# Patient Record
Sex: FEMALE | Race: WHITE | NOT HISPANIC OR LATINO | Employment: OTHER | URBAN - METROPOLITAN AREA
[De-identification: names, ages, dates, MRNs, and addresses within clinical notes are randomized per-mention and may not be internally consistent; named-entity substitution may affect disease eponyms.]

---

## 2017-02-13 ENCOUNTER — OUTSIDE FACILITY SERVICE (OUTPATIENT)
Dept: SURGERY | Facility: CLINIC | Age: 52
End: 2017-02-13

## 2017-02-13 PROCEDURE — G0121 COLON CA SCRN NOT HI RSK IND: HCPCS | Performed by: SURGERY

## 2017-05-16 ENCOUNTER — OFFICE VISIT (OUTPATIENT)
Dept: NEUROLOGY | Facility: CLINIC | Age: 52
End: 2017-05-16

## 2017-05-16 VITALS
HEIGHT: 63 IN | DIASTOLIC BLOOD PRESSURE: 58 MMHG | SYSTOLIC BLOOD PRESSURE: 104 MMHG | OXYGEN SATURATION: 100 % | HEART RATE: 85 BPM

## 2017-05-16 DIAGNOSIS — F45.9 SOMATOFORM DISORDER: ICD-10-CM

## 2017-05-16 DIAGNOSIS — G40.909 SEIZURE DISORDER (HCC): Primary | ICD-10-CM

## 2017-05-16 DIAGNOSIS — F19.982 DRUG INDUCED INSOMNIA (HCC): ICD-10-CM

## 2017-05-16 DIAGNOSIS — G43.109 MIGRAINE WITH AURA AND WITHOUT STATUS MIGRAINOSUS, NOT INTRACTABLE: ICD-10-CM

## 2017-05-16 PROCEDURE — 99204 OFFICE O/P NEW MOD 45 MIN: CPT | Performed by: PSYCHIATRY & NEUROLOGY

## 2017-05-16 RX ORDER — TOPIRAMATE 50 MG/1
CAPSULE, EXTENDED RELEASE ORAL
Qty: 90 CAPSULE | Refills: 5 | Status: SHIPPED | OUTPATIENT
Start: 2017-05-16 | End: 2017-05-17 | Stop reason: SDUPTHER

## 2017-05-17 RX ORDER — TOPIRAMATE 50 MG/1
CAPSULE, EXTENDED RELEASE ORAL
Qty: 90 CAPSULE | Refills: 5 | Status: SHIPPED | OUTPATIENT
Start: 2017-05-17 | End: 2019-08-20

## 2017-06-08 ENCOUNTER — TELEPHONE (OUTPATIENT)
Dept: NEUROLOGY | Facility: CLINIC | Age: 52
End: 2017-06-08

## 2017-06-08 NOTE — TELEPHONE ENCOUNTER
I spoke to Mrs. Tavarez after the Trokendi was approved by Medicare it is still going to cost her over $200 a month and she can not use the copay card since she is on Medicare.

## 2017-07-13 ENCOUNTER — TELEPHONE (OUTPATIENT)
Dept: NEUROLOGY | Facility: CLINIC | Age: 52
End: 2017-07-13

## 2017-07-13 NOTE — TELEPHONE ENCOUNTER
Patient calling to request more samples of Trokendi. Please call her when these samples are ready to be picked up.

## 2017-10-30 ENCOUNTER — TRANSCRIBE ORDERS (OUTPATIENT)
Dept: ADMINISTRATIVE | Facility: HOSPITAL | Age: 52
End: 2017-10-30

## 2017-10-30 DIAGNOSIS — Z12.39 BREAST SCREENING: Primary | ICD-10-CM

## 2017-11-30 ENCOUNTER — APPOINTMENT (OUTPATIENT)
Dept: MAMMOGRAPHY | Facility: HOSPITAL | Age: 52
End: 2017-11-30

## 2018-01-05 ENCOUNTER — HOSPITAL ENCOUNTER (OUTPATIENT)
Dept: MAMMOGRAPHY | Facility: HOSPITAL | Age: 53
Discharge: HOME | End: 2018-01-05
Admitting: OBSTETRICS & GYNECOLOGY

## 2018-01-05 DIAGNOSIS — Z12.39 BREAST SCREENING: ICD-10-CM

## 2018-01-05 PROCEDURE — 77067 SCR MAMMO BI INCL CAD: CPT

## 2018-01-05 PROCEDURE — 77063 BREAST TOMOSYNTHESIS BI: CPT

## 2018-01-30 ENCOUNTER — TRANSCRIBE ORDERS (OUTPATIENT)
Dept: MRI IMAGING | Facility: HOSPITAL | Age: 53
End: 2018-01-30

## 2018-01-30 DIAGNOSIS — R92.8 ABNORMAL MAMMOGRAM: Primary | ICD-10-CM

## 2018-02-02 ENCOUNTER — HOSPITAL ENCOUNTER (OUTPATIENT)
Dept: MAMMOGRAPHY | Facility: HOSPITAL | Age: 53
Discharge: HOME | End: 2018-02-02
Admitting: OBSTETRICS & GYNECOLOGY

## 2018-02-02 DIAGNOSIS — R92.8 ABNORMAL MAMMOGRAM: ICD-10-CM

## 2018-02-02 PROCEDURE — 77065 DX MAMMO INCL CAD UNI: CPT

## 2018-02-06 ENCOUNTER — TELEPHONE (OUTPATIENT)
Dept: INTERVENTIONAL RADIOLOGY/VASCULAR | Facility: HOSPITAL | Age: 53
End: 2018-02-06

## 2018-02-06 DIAGNOSIS — E78.5 HYPERLIPIDEMIA, UNSPECIFIED HYPERLIPIDEMIA TYPE: Primary | ICD-10-CM

## 2018-02-06 DIAGNOSIS — Z11.59 NEED FOR HEPATITIS C SCREENING TEST: ICD-10-CM

## 2018-02-09 LAB
ALBUMIN SERPL-MCNC: 4.2 G/DL (ref 3.5–5.2)
ALBUMIN/GLOB SERPL: 1.6 G/DL
ALP SERPL-CCNC: 70 U/L (ref 39–117)
ALT SERPL-CCNC: 12 U/L (ref 1–33)
AST SERPL-CCNC: 13 U/L (ref 1–32)
BILIRUB SERPL-MCNC: 0.3 MG/DL (ref 0.1–1.2)
BUN SERPL-MCNC: 12 MG/DL (ref 6–20)
BUN/CREAT SERPL: 14 (ref 7–25)
CALCIUM SERPL-MCNC: 9.7 MG/DL (ref 8.6–10.5)
CHLORIDE SERPL-SCNC: 102 MMOL/L (ref 98–107)
CHOLEST SERPL-MCNC: 264 MG/DL (ref 0–200)
CO2 SERPL-SCNC: 26.3 MMOL/L (ref 22–29)
CREAT SERPL-MCNC: 0.86 MG/DL (ref 0.57–1)
GFR SERPLBLD CREATININE-BSD FMLA CKD-EPI: 69 ML/MIN/1.73
GFR SERPLBLD CREATININE-BSD FMLA CKD-EPI: 84 ML/MIN/1.73
GLOBULIN SER CALC-MCNC: 2.6 GM/DL
GLUCOSE SERPL-MCNC: 103 MG/DL (ref 65–99)
HCV AB S/CO SERPL IA: 0.1 S/CO RATIO (ref 0–0.9)
HCV AB SERPL QL IA: NORMAL
HDLC SERPL-MCNC: 42 MG/DL (ref 40–60)
LDLC SERPL CALC-MCNC: 202 MG/DL (ref 0–100)
LDLC/HDLC SERPL: 4.8 {RATIO}
POTASSIUM SERPL-SCNC: 4.9 MMOL/L (ref 3.5–5.2)
PROT SERPL-MCNC: 6.8 G/DL (ref 6–8.5)
SODIUM SERPL-SCNC: 143 MMOL/L (ref 136–145)
TRIGL SERPL-MCNC: 102 MG/DL (ref 0–150)
VLDLC SERPL CALC-MCNC: 20.4 MG/DL (ref 5–40)

## 2018-02-12 ENCOUNTER — HOSPITAL ENCOUNTER (OUTPATIENT)
Dept: MAMMOGRAPHY | Facility: HOSPITAL | Age: 53
Discharge: HOME | End: 2018-02-12
Admitting: OBSTETRICS & GYNECOLOGY

## 2018-02-12 VITALS
DIASTOLIC BLOOD PRESSURE: 72 MMHG | HEIGHT: 63 IN | RESPIRATION RATE: 18 BRPM | WEIGHT: 115 LBS | TEMPERATURE: 98.1 F | HEART RATE: 86 BPM | BODY MASS INDEX: 20.38 KG/M2 | SYSTOLIC BLOOD PRESSURE: 106 MMHG | OXYGEN SATURATION: 98 %

## 2018-02-12 DIAGNOSIS — R92.8 ABNORMAL MAMMOGRAM OF RIGHT BREAST: ICD-10-CM

## 2018-02-12 PROCEDURE — 88305 TISSUE EXAM BY PATHOLOGIST: CPT | Performed by: INTERNAL MEDICINE

## 2018-02-12 RX ORDER — LIDOCAINE HYDROCHLORIDE 10 MG/ML
20 INJECTION, SOLUTION INFILTRATION; PERINEURAL ONCE
Status: COMPLETED | OUTPATIENT
Start: 2018-02-12 | End: 2018-02-12

## 2018-02-12 RX ADMIN — LIDOCAINE HYDROCHLORIDE 20 ML: 10 INJECTION, SOLUTION INFILTRATION; PERINEURAL at 12:50

## 2018-02-12 NOTE — NURSING NOTE
Biopsy site to right outer breast clear with Dermabond dry and intact. No firmness or swelling noted at or around biopsy site. Denies pain. Wide Ace pressure wrap applied in lieu of bra per Dr. Jaime's request. Instructed patient the importance of wearing the wrap 24/7 for at least 2 days and then to a bra 24/7 x2 additional days. Ice pack with protective covering applied to biopsy site. Discharge instructions discussed with understanding voiced by patient. Copies provided to patient. No distress noted. To home via private vehicle accompanied by her daughter.

## 2018-02-14 ENCOUNTER — OFFICE VISIT (OUTPATIENT)
Dept: FAMILY MEDICINE CLINIC | Facility: CLINIC | Age: 53
End: 2018-02-14

## 2018-02-14 VITALS
SYSTOLIC BLOOD PRESSURE: 100 MMHG | DIASTOLIC BLOOD PRESSURE: 72 MMHG | BODY MASS INDEX: 19.95 KG/M2 | WEIGHT: 112.6 LBS | TEMPERATURE: 98.3 F | HEART RATE: 79 BPM | RESPIRATION RATE: 16 BRPM | OXYGEN SATURATION: 99 % | HEIGHT: 63 IN

## 2018-02-14 DIAGNOSIS — E78.00 ELEVATED CHOLESTEROL: ICD-10-CM

## 2018-02-14 DIAGNOSIS — R63.4 WEIGHT LOSS, UNINTENTIONAL: Primary | ICD-10-CM

## 2018-02-14 LAB
BASOPHILS # BLD AUTO: 0.05 10*3/MM3 (ref 0–0.2)
BASOPHILS NFR BLD AUTO: 0.5 % (ref 0–1.5)
CYTO UR: NORMAL
EOSINOPHIL # BLD AUTO: 0.05 10*3/MM3 (ref 0–0.7)
EOSINOPHIL NFR BLD AUTO: 0.5 % (ref 0.3–6.2)
ERYTHROCYTE [DISTWIDTH] IN BLOOD BY AUTOMATED COUNT: 13.1 % (ref 11.7–13)
HCT VFR BLD AUTO: 43.6 % (ref 35.6–45.5)
HGB BLD-MCNC: 14.3 G/DL (ref 11.9–15.5)
IMM GRANULOCYTES # BLD: 0.03 10*3/MM3 (ref 0–0.03)
IMM GRANULOCYTES NFR BLD: 0.3 % (ref 0–0.5)
LAB AP CASE REPORT: NORMAL
LAB AP CLINICAL INFORMATION: NORMAL
LYMPHOCYTES # BLD AUTO: 2.38 10*3/MM3 (ref 0.9–4.8)
LYMPHOCYTES NFR BLD AUTO: 21.8 % (ref 19.6–45.3)
Lab: NORMAL
MCH RBC QN AUTO: 31.9 PG (ref 26.9–32)
MCHC RBC AUTO-ENTMCNC: 32.8 G/DL (ref 32.4–36.3)
MCV RBC AUTO: 97.3 FL (ref 80.5–98.2)
MONOCYTES # BLD AUTO: 0.5 10*3/MM3 (ref 0.2–1.2)
MONOCYTES NFR BLD AUTO: 4.6 % (ref 5–12)
NEUTROPHILS # BLD AUTO: 7.9 10*3/MM3 (ref 1.9–8.1)
NEUTROPHILS NFR BLD AUTO: 72.3 % (ref 42.7–76)
PATH REPORT.FINAL DX SPEC: NORMAL
PATH REPORT.GROSS SPEC: NORMAL
PLATELET # BLD AUTO: 333 10*3/MM3 (ref 140–500)
RBC # BLD AUTO: 4.48 10*6/MM3 (ref 3.9–5.2)
TSH SERPL DL<=0.005 MIU/L-ACNC: 1.46 MIU/ML (ref 0.27–4.2)
WBC # BLD AUTO: 10.91 10*3/MM3 (ref 4.5–10.7)

## 2018-02-14 PROCEDURE — 99213 OFFICE O/P EST LOW 20 MIN: CPT | Performed by: INTERNAL MEDICINE

## 2018-02-14 RX ORDER — ATORVASTATIN CALCIUM 80 MG/1
80 TABLET, FILM COATED ORAL NIGHTLY
Qty: 30 TABLET | Refills: 2 | Status: SHIPPED | OUTPATIENT
Start: 2018-02-14 | End: 2019-06-03 | Stop reason: SDUPTHER

## 2018-02-20 NOTE — PROGRESS NOTES
Subjective   Lissa Tavarez is a 52 y.o. female. Patient is here today for   Chief Complaint   Patient presents with   • Follow-up     ELEVATED CHOLESTEROL           Vitals:    02/14/18 1021   BP: 100/72   Pulse: 79   Resp: 16   Temp: 98.3 °F (36.8 °C)   SpO2: 99%       Past Medical History:   Diagnosis Date   • Anxiety    • Cerebral arterial aneurysm    • Depression    • Gestational diabetes    • Hyperglycemia    • Hyperlipidemia    • Seizure disorder    • Seizures       Allergies   Allergen Reactions   • Codeine Phosphate [Codeine] Hives   • Sulfa Antibiotics Hives      Social History     Social History   • Marital status:      Spouse name: N/A   • Number of children: N/A   • Years of education: N/A     Occupational History   • Not on file.     Social History Main Topics   • Smoking status: Former Smoker   • Smokeless tobacco: Former User   • Alcohol use Yes   • Drug use: No   • Sexual activity: Not on file     Other Topics Concern   • Not on file     Social History Narrative        Current Outpatient Prescriptions:   •  atorvastatin (LIPITOR) 80 MG tablet, Take 1 tablet by mouth Every Night., Disp: 30 tablet, Rfl: 2  •  MELATONIN PO, Take 6 mg by mouth Every Night., Disp: , Rfl:   •  terbinafine (LamISIL) 1 % cream, Apply topically., Disp: , Rfl:   •  Topiramate ER 50 MG capsule sustained-release 24 hr, Take 3 at bedtime (Patient taking differently: Take 2 at bedtime), Disp: 90 capsule, Rfl: 5     Objective     HPI Comments: She is here to follow-up on hypercholesterolemia.    She tells me that she is fatigued.  She is a bit anxious because she had a breast biopsy and the pathology is still pending.    She is in had some unintentional weight loss over the last year.       Review of Systems   Constitutional: Positive for fatigue.   HENT: Negative.    Respiratory: Negative.    Cardiovascular: Negative.    Musculoskeletal: Negative.    Psychiatric/Behavioral: Negative.        Physical Exam   Constitutional: She  is oriented to person, place, and time.   Pleasant, neatly groomed, BMI 18.   HENT:   Head: Normocephalic and atraumatic.   Cardiovascular: Normal rate and regular rhythm.    Pulmonary/Chest: Effort normal.   Neurological: She is alert and oriented to person, place, and time.   Psychiatric: She has a normal mood and affect. Her behavior is normal.   Nursing note and vitals reviewed.        Problem List Items Addressed This Visit        Cardiovascular and Mediastinum    Elevated cholesterol    Relevant Medications    atorvastatin (LIPITOR) 80 MG tablet       Other    Weight loss, unintentional - Primary    Relevant Orders    CBC & Differential (Completed)    TSH Rfx On Abnormal To Free T4 (Completed)            PLAN  Like to check a CBC and TSH regarding her unintentional weight loss.    I increased her atorvastatin 80 mg daily.  Like to have her back in about 3 months recheck lipid and comprehensive metabolic panel at that time.  No Follow-up on file.

## 2018-05-08 DIAGNOSIS — E78.5 HYPERLIPIDEMIA, UNSPECIFIED HYPERLIPIDEMIA TYPE: ICD-10-CM

## 2018-05-08 DIAGNOSIS — Z79.899 LONG TERM USE OF DRUG: Primary | ICD-10-CM

## 2018-08-30 ENCOUNTER — TELEPHONE (OUTPATIENT)
Dept: NEUROSURGERY | Facility: CLINIC | Age: 53
End: 2018-08-30

## 2018-08-30 NOTE — TELEPHONE ENCOUNTER
Patient is a 5 yr recall. Pt called to schedule her appts. She is going on vacation from 9/27 -10/11. She is scheduled to see LB on 11/1.  Mailing forms   Pt has new ins [facility] [employer] Assured  ID [id number]  [id number]  [facility]

## 2018-12-12 ENCOUNTER — TELEPHONE (OUTPATIENT)
Dept: NEUROSURGERY | Facility: CLINIC | Age: 53
End: 2018-12-12

## 2018-12-12 NOTE — TELEPHONE ENCOUNTER
----- Message from Kalpana Gomez sent at 12/12/2018  1:32 PM EST -----  Pt cancelled appt on 12/19 due to having insurance issue and not being cover. She rescheduled in January.

## 2019-01-23 ENCOUNTER — OFFICE VISIT (OUTPATIENT)
Dept: NEUROSURGERY | Facility: CLINIC | Age: 54
End: 2019-01-23

## 2019-01-23 VITALS
BODY MASS INDEX: 20.91 KG/M2 | HEIGHT: 63 IN | HEART RATE: 72 BPM | RESPIRATION RATE: 16 BRPM | WEIGHT: 118 LBS | SYSTOLIC BLOOD PRESSURE: 106 MMHG | DIASTOLIC BLOOD PRESSURE: 62 MMHG

## 2019-01-23 DIAGNOSIS — I67.1 CEREBRAL ANEURYSM, NONRUPTURED: ICD-10-CM

## 2019-01-23 DIAGNOSIS — Z98.890 HISTORY OF CRANIOTOMY: ICD-10-CM

## 2019-01-23 DIAGNOSIS — I67.89 OTHER CEREBROVASCULAR DISEASE: ICD-10-CM

## 2019-01-23 DIAGNOSIS — I67.1 CEREBRAL ANEURYSM: Primary | ICD-10-CM

## 2019-01-23 PROCEDURE — 99203 OFFICE O/P NEW LOW 30 MIN: CPT | Performed by: NURSE PRACTITIONER

## 2019-01-23 RX ORDER — SODIUM CHLORIDE 9 MG/ML
100 INJECTION, SOLUTION INTRAVENOUS CONTINUOUS
Status: CANCELLED | OUTPATIENT
Start: 2019-02-14

## 2019-01-23 NOTE — PROGRESS NOTES
"Subjective   Patient ID: Lissa Tavarez is a 53 y.o. female is here today for follow-up of previously treated aneurysms prior to repeating angiography. She is accompanied by her cousin, Martha.    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  There was recannulization and she underwent attempted endovascular embolization on January 18, 2007 that was aborted secondary to vasospasm.  She ultimately underwent right craniotomy and clipping in February 2007.  She presents today for preoperative evaluation before undergoing follow-up cerebral angiogram for continued aneurysm surveillance.  She was last seen in September 2013 and at that time angiogram demonstrated total occlusion of the clipped aneurysm.  There was no evidence of new aneurysm formation.    Today, she states that she is doing and feeling very well.  She does have some \"weird eye issues\".  She underwent a full ophthalmology evaluation recently and was given new glasses.  She describes intermittent changes in her vision but at times seems blurry.  She attributes this to her \"focal seizures\".  She remains on Topamax for seizure prophylaxis and is followed closely by neurology.  She denies any double vision.  She also denies any gait or balance issues as well as any other neuro changes at this time.    She presents with her cousin.      /62 (BP Location: Right arm, Patient Position: Sitting, Cuff Size: Adult)   Pulse 72   Resp 16   Ht 160 cm (63\")   Wt 53.5 kg (118 lb)   BMI 20.90 kg/m²       The following portions of the patient's history were reviewed and updated as appropriate: allergies, current medications, past family history, past medical history, past social history, past surgical history and problem list.    Review of Systems   Constitutional: Negative for fever.   HENT: Negative for trouble swallowing.  "   Respiratory: Negative for cough.    Cardiovascular: Negative for chest pain and leg swelling.   Musculoskeletal: Positive for gait problem.   Neurological: Positive for dizziness, light-headedness and headaches. Negative for speech difficulty.   Psychiatric/Behavioral: Negative for decreased concentration.       Objective   Physical Exam   Constitutional: She is oriented to person, place, and time. Vital signs are normal. She appears well-developed and well-nourished. She is cooperative.  Non-toxic appearance. She does not have a sickly appearance. She does not appear ill.   Very pleasant well appearing middle-aged female   HENT:   Head: Normocephalic and atraumatic.   Eyes: EOM are normal. Pupils are equal, round, and reactive to light.   Neck: Neck supple. No tracheal deviation present.   Cardiovascular: Normal rate, regular rhythm, normal heart sounds and intact distal pulses.   Pulmonary/Chest: Effort normal and breath sounds normal. No respiratory distress.   Musculoskeletal: Normal range of motion.   Moving all extremities well   Neurological: She is alert and oriented to person, place, and time. She has normal strength. She displays no tremor. No cranial nerve deficit or sensory deficit. Coordination and gait normal. GCS eye subscore is 4. GCS verbal subscore is 5. GCS motor subscore is 6.   Stable upright gait, able to heel and toe walk, able to tandem  Cranial nerves II through XII grossly intact   Skin: Skin is warm and dry.   Psychiatric: She has a normal mood and affect. Her behavior is normal. Thought content normal.   Vitals reviewed.    Neurologic Exam     Mental Status   Oriented to person, place, and time.     Cranial Nerves     CN III, IV, VI   Pupils are equal, round, and reactive to light.  Extraocular motions are normal.     Motor Exam     Strength   Strength 5/5 throughout.       Assessment/Plan   Independent Review of Radiographic Studies:    No new imaging    Medical Decision Making:    She  "returns to the office today for 5 year follow-up with history treated cerebral aneurysm.  Exam as noted above, no red flags.  She will be scheduled for follow-up cerebral angiogram following today's office visit.  Upon full clinical evaluation she is stable to proceed forward.  Risks and benefits of the angiogram were discussed at length and she wishes to proceed forward.  Preoperative testing will be reviewed accordingly.  She has no neuro deficits on exam.    Plan: Cerebral angiogram, return to office as scheduled following  Lissa was seen today for cerebral aneurysm.    Diagnoses and all orders for this visit:    Cerebral aneurysm  -     Case Request; Standing  -     CBC and Differential; Future  -     Comprehensive metabolic panel; Future  -     Protime-INR; Future  -     Sedimentation rate; Future  -     sodium chloride 0.9 % infusion; Infuse 100 mL/hr into a venous catheter Continuous.  -     Case Request    Other cerebrovascular disease   -     Protime-INR; Future    Cerebral aneurysm, nonruptured    History of craniotomy    Other orders  -     Follow Anesthesia Guidelines / Standing Orders; Future  -     Obtain informed consent  -     Provide NPO Instructions to Patient; Future  -     Provide Patient with Enhanced Recovery Booklet(s) or Handout; Future  -     Provide \"Carbo Loading\" Instructions to Patient; Future  -     Follow Anesthesia Guidelines / Standing Orders; Standing  -     Verify NPO Status; Standing  -     POC Glucose Once; Standing      No Follow-up on file.                 "

## 2019-01-24 PROBLEM — I67.1 CEREBRAL ANEURYSM: Status: ACTIVE | Noted: 2019-01-24

## 2019-01-28 ENCOUNTER — RESULTS ENCOUNTER (OUTPATIENT)
Dept: NEUROSURGERY | Facility: CLINIC | Age: 54
End: 2019-01-28

## 2019-01-28 DIAGNOSIS — I67.1 CEREBRAL ANEURYSM: ICD-10-CM

## 2019-01-28 DIAGNOSIS — I67.89 OTHER CEREBROVASCULAR DISEASE: ICD-10-CM

## 2019-02-05 ENCOUNTER — APPOINTMENT (OUTPATIENT)
Dept: PREADMISSION TESTING | Facility: HOSPITAL | Age: 54
End: 2019-02-05

## 2019-02-05 VITALS
HEART RATE: 86 BPM | BODY MASS INDEX: 21.54 KG/M2 | DIASTOLIC BLOOD PRESSURE: 79 MMHG | HEIGHT: 63 IN | OXYGEN SATURATION: 100 % | WEIGHT: 121.56 LBS | TEMPERATURE: 98.1 F | RESPIRATION RATE: 10 BRPM | SYSTOLIC BLOOD PRESSURE: 126 MMHG

## 2019-02-05 LAB
ALBUMIN SERPL-MCNC: 4.1 G/DL (ref 3.5–5.2)
ALBUMIN/GLOB SERPL: 1.4 G/DL
ALP SERPL-CCNC: 76 U/L (ref 39–117)
ALT SERPL W P-5'-P-CCNC: 15 U/L (ref 1–33)
ANION GAP SERPL CALCULATED.3IONS-SCNC: 12.9 MMOL/L
AST SERPL-CCNC: 15 U/L (ref 1–32)
BASOPHILS # BLD AUTO: 0.06 10*3/MM3 (ref 0–0.2)
BASOPHILS NFR BLD AUTO: 0.6 % (ref 0–1.5)
BILIRUB SERPL-MCNC: 0.3 MG/DL (ref 0.1–1.2)
BUN BLD-MCNC: 15 MG/DL (ref 6–20)
BUN/CREAT SERPL: 15.8 (ref 7–25)
CALCIUM SPEC-SCNC: 9.4 MG/DL (ref 8.6–10.5)
CHLORIDE SERPL-SCNC: 102 MMOL/L (ref 98–107)
CO2 SERPL-SCNC: 27.1 MMOL/L (ref 22–29)
CREAT BLD-MCNC: 0.95 MG/DL (ref 0.57–1)
DEPRECATED RDW RBC AUTO: 46.9 FL (ref 37–54)
EOSINOPHIL # BLD AUTO: 0.08 10*3/MM3 (ref 0–0.7)
EOSINOPHIL NFR BLD AUTO: 0.8 % (ref 0.3–6.2)
ERYTHROCYTE [DISTWIDTH] IN BLOOD BY AUTOMATED COUNT: 13.3 % (ref 11.7–13)
ERYTHROCYTE [SEDIMENTATION RATE] IN BLOOD: 7 MM/HR (ref 0–30)
GFR SERPL CREATININE-BSD FRML MDRD: 61 ML/MIN/1.73
GLOBULIN UR ELPH-MCNC: 3 GM/DL
GLUCOSE BLD-MCNC: 104 MG/DL (ref 65–99)
HCT VFR BLD AUTO: 43.2 % (ref 35.6–45.5)
HGB BLD-MCNC: 13.9 G/DL (ref 11.9–15.5)
IMM GRANULOCYTES # BLD AUTO: 0.02 10*3/MM3 (ref 0–0.03)
IMM GRANULOCYTES NFR BLD AUTO: 0.2 % (ref 0–0.5)
INR PPP: 0.96 (ref 0.9–1.1)
LYMPHOCYTES # BLD AUTO: 3.59 10*3/MM3 (ref 0.9–4.8)
LYMPHOCYTES NFR BLD AUTO: 33.9 % (ref 19.6–45.3)
MCH RBC QN AUTO: 31 PG (ref 26.9–32)
MCHC RBC AUTO-ENTMCNC: 32.2 G/DL (ref 32.4–36.3)
MCV RBC AUTO: 96.4 FL (ref 80.5–98.2)
MONOCYTES # BLD AUTO: 0.59 10*3/MM3 (ref 0.2–1.2)
MONOCYTES NFR BLD AUTO: 5.6 % (ref 5–12)
NEUTROPHILS # BLD AUTO: 6.26 10*3/MM3 (ref 1.9–8.1)
NEUTROPHILS NFR BLD AUTO: 58.9 % (ref 42.7–76)
PLATELET # BLD AUTO: 283 10*3/MM3 (ref 140–500)
PMV BLD AUTO: 8.5 FL (ref 6–12)
POTASSIUM BLD-SCNC: 3.8 MMOL/L (ref 3.5–5.2)
PROT SERPL-MCNC: 7.1 G/DL (ref 6–8.5)
PROTHROMBIN TIME: 12.6 SECONDS (ref 11.7–14.2)
RBC # BLD AUTO: 4.48 10*6/MM3 (ref 3.9–5.2)
SODIUM BLD-SCNC: 142 MMOL/L (ref 136–145)
WBC NRBC COR # BLD: 10.6 10*3/MM3 (ref 4.5–10.7)

## 2019-02-05 PROCEDURE — 85025 COMPLETE CBC W/AUTO DIFF WBC: CPT | Performed by: NURSE PRACTITIONER

## 2019-02-05 PROCEDURE — 36415 COLL VENOUS BLD VENIPUNCTURE: CPT | Performed by: NURSE PRACTITIONER

## 2019-02-05 PROCEDURE — 85652 RBC SED RATE AUTOMATED: CPT | Performed by: NURSE PRACTITIONER

## 2019-02-05 PROCEDURE — 85610 PROTHROMBIN TIME: CPT | Performed by: NURSE PRACTITIONER

## 2019-02-05 PROCEDURE — 80053 COMPREHEN METABOLIC PANEL: CPT | Performed by: NURSE PRACTITIONER

## 2019-02-05 NOTE — DISCHARGE INSTRUCTIONS
Take the following medications the morning of surgery with a small sip of water:    TOPIRAMATE    General Instructions:  • Do not eat or drink anything after midnight the night before surgery.  • Bring any papers given to you in the doctor’s office.  • Wear clean comfortable clothes and socks.  • Do not wear contact lenses or make-up.  Bring a case for your glasses.   • Remove all piercings.  Leave jewelry and any other valuables at home.  • Hair extensions with metal clips must be removed prior to surgery.  • The Pre-Admission Testing nurse will instruct you to bring medications if unable to obtain an accurate list in Pre-Admission Testing.    • REPORT TO MAIN SURGERY ON 2-14-19 AT 0630 AM          Preventing a Surgical Site Infection:  • For 2 to 3 days before surgery, avoid shaving with a razor because the razor can irritate skin and make it easier to develop an infection.    • Any areas of open skin can increase the risk of a post-operative wound infection by allowing bacteria to enter and travel throughout the body.  Notify your surgeon if you have any skin wounds / rashes even if it is not near the expected surgical site.  The area will need assessed to determine if surgery should be delayed until it is healed.  • The night prior to surgery sleep in a clean bed with clean clothing.  Do not allow pets to sleep with you.  • Shower on the morning of surgery using a fresh bar of anti-bacterial soap (such as Dial) and clean washcloth.  Dry with a clean towel and dress in clean clothing.  • Ask your surgeon if you will be receiving antibiotics prior to surgery.  • Make sure you, your family, and all healthcare providers clean their hands with soap and water or an alcohol based hand  before caring for you or your wound.    Day of surgery:  Upon arrival, a Pre-op nurse and Anesthesiologist will review your health history, obtain vital signs, and answer questions you may have.  The only belongings needed at  this time will be your home medications and if applicable your C-PAP/BI-PAP machine.  If you are staying overnight your family can leave the rest of your belongings in the car and bring them to your room later.  A Pre-op nurse will start an IV and you may receive medication in preparation for surgery, including something to help you relax.  Your family will be able to see you in the Pre-op area.  While you are in surgery your family should notify the waiting room  if they leave the waiting room area and provide a contact phone number.    Please be aware that surgery does come with discomfort.  We want to make every effort to control your discomfort so please discuss any uncontrolled symptoms with your nurse.   Your doctor will most likely have prescribed pain medications.      If you are going home after surgery you will receive individualized written care instructions before being discharged.  A responsible adult must drive you to and from the hospital on the day of your surgery and stay with you for 24 hours.        You have received a list of surgical assistants for your reference.  If you have any questions please call Pre-Admission Testing at [phone].  Deductibles and co-payments are collected on the day of service. Please be prepared to pay the required co-pay, deductible or deposit on the day of service as defined by your plan.

## 2019-02-14 ENCOUNTER — HOSPITAL ENCOUNTER (OUTPATIENT)
Facility: HOSPITAL | Age: 54
Setting detail: HOSPITAL OUTPATIENT SURGERY
Discharge: HOME | End: 2019-02-14
Attending: NEUROLOGICAL SURGERY | Admitting: NEUROLOGICAL SURGERY

## 2019-02-14 ENCOUNTER — APPOINTMENT (OUTPATIENT)
Dept: GENERAL RADIOLOGY | Facility: HOSPITAL | Age: 54
End: 2019-02-14

## 2019-02-14 VITALS
SYSTOLIC BLOOD PRESSURE: 116 MMHG | WEIGHT: 119.25 LBS | OXYGEN SATURATION: 99 % | BODY MASS INDEX: 21.13 KG/M2 | TEMPERATURE: 98.3 F | HEIGHT: 63 IN | HEART RATE: 70 BPM | DIASTOLIC BLOOD PRESSURE: 37 MMHG | RESPIRATION RATE: 18 BRPM

## 2019-02-14 DIAGNOSIS — I67.1 CEREBRAL ANEURYSM: ICD-10-CM

## 2019-02-14 LAB — GLUCOSE BLDC GLUCOMTR-MCNC: 107 MG/DL (ref 70–130)

## 2019-02-14 PROCEDURE — 36224 PLACE CATH CAROTD ART: CPT | Performed by: NEUROLOGICAL SURGERY

## 2019-02-14 PROCEDURE — 76377 3D RENDER W/INTRP POSTPROCES: CPT

## 2019-02-14 PROCEDURE — 76377 3D RENDER W/INTRP POSTPROCES: CPT | Performed by: NEUROLOGICAL SURGERY

## 2019-02-14 PROCEDURE — C1769 GUIDE WIRE: HCPCS | Performed by: NEUROLOGICAL SURGERY

## 2019-02-14 PROCEDURE — C1894 INTRO/SHEATH, NON-LASER: HCPCS | Performed by: NEUROLOGICAL SURGERY

## 2019-02-14 PROCEDURE — 25010000002 ONDANSETRON PER 1 MG: Performed by: NEUROLOGICAL SURGERY

## 2019-02-14 PROCEDURE — 82962 GLUCOSE BLOOD TEST: CPT

## 2019-02-14 PROCEDURE — C1760 CLOSURE DEV, VASC: HCPCS | Performed by: NEUROLOGICAL SURGERY

## 2019-02-14 PROCEDURE — 0 IODIXANOL PER 1 ML: Performed by: NEUROLOGICAL SURGERY

## 2019-02-14 PROCEDURE — 25010000002 HEPARIN (PORCINE) PER 1000 UNITS: Performed by: NEUROLOGICAL SURGERY

## 2019-02-14 PROCEDURE — 25010000002 MIDAZOLAM PER 1 MG: Performed by: NEUROLOGICAL SURGERY

## 2019-02-14 RX ORDER — FENTANYL CITRATE 50 UG/ML
INJECTION, SOLUTION INTRAMUSCULAR; INTRAVENOUS
Status: DISCONTINUED
Start: 2019-02-14 | End: 2019-02-14 | Stop reason: WASHOUT

## 2019-02-14 RX ORDER — MIDAZOLAM HYDROCHLORIDE 1 MG/ML
INJECTION INTRAMUSCULAR; INTRAVENOUS
Status: DISCONTINUED
Start: 2019-02-14 | End: 2019-02-14 | Stop reason: HOSPADM

## 2019-02-14 RX ORDER — MIDAZOLAM HYDROCHLORIDE 1 MG/ML
INJECTION INTRAMUSCULAR; INTRAVENOUS
Status: COMPLETED | OUTPATIENT
Start: 2019-02-14 | End: 2019-02-14

## 2019-02-14 RX ORDER — SODIUM CHLORIDE 9 MG/ML
100 INJECTION, SOLUTION INTRAVENOUS CONTINUOUS
Status: DISCONTINUED | OUTPATIENT
Start: 2019-02-14 | End: 2019-02-14 | Stop reason: HOSPADM

## 2019-02-14 RX ORDER — ONDANSETRON 2 MG/ML
INJECTION INTRAMUSCULAR; INTRAVENOUS
Status: COMPLETED | OUTPATIENT
Start: 2019-02-14 | End: 2019-02-14

## 2019-02-14 RX ORDER — SODIUM CHLORIDE 9 MG/ML
INJECTION, SOLUTION INTRAVENOUS
Status: COMPLETED | OUTPATIENT
Start: 2019-02-14 | End: 2019-02-14

## 2019-02-14 RX ORDER — ATORVASTATIN CALCIUM 80 MG/1
80 TABLET, FILM COATED ORAL NIGHTLY
Status: DISCONTINUED | OUTPATIENT
Start: 2019-02-14 | End: 2019-02-14 | Stop reason: HOSPADM

## 2019-02-14 RX ORDER — IODIXANOL 320 MG/ML
250 INJECTION, SOLUTION INTRAVASCULAR
Status: COMPLETED | OUTPATIENT
Start: 2019-02-14 | End: 2019-02-14

## 2019-02-14 RX ADMIN — ONDANSETRON 4 MG: 2 INJECTION INTRAMUSCULAR; INTRAVENOUS at 09:24

## 2019-02-14 RX ADMIN — SODIUM CHLORIDE 250 ML: 0.9 INJECTION, SOLUTION INTRAVENOUS at 09:31

## 2019-02-14 RX ADMIN — IODIXANOL 55.6 ML: 320 INJECTION, SOLUTION INTRAVASCULAR at 08:20

## 2019-02-14 RX ADMIN — SODIUM CHLORIDE 250 ML: 0.9 INJECTION, SOLUTION INTRAVENOUS at 09:42

## 2019-02-14 RX ADMIN — Medication 2 MG: at 09:19

## 2019-02-27 ENCOUNTER — OFFICE VISIT (OUTPATIENT)
Dept: NEUROSURGERY | Facility: CLINIC | Age: 54
End: 2019-02-27

## 2019-02-27 VITALS
BODY MASS INDEX: 21.62 KG/M2 | SYSTOLIC BLOOD PRESSURE: 100 MMHG | RESPIRATION RATE: 16 BRPM | TEMPERATURE: 99.3 F | WEIGHT: 122 LBS | HEART RATE: 88 BPM | HEIGHT: 63 IN | DIASTOLIC BLOOD PRESSURE: 60 MMHG

## 2019-02-27 DIAGNOSIS — I67.1 CEREBRAL ANEURYSM: Primary | ICD-10-CM

## 2019-02-27 PROCEDURE — 99213 OFFICE O/P EST LOW 20 MIN: CPT | Performed by: NURSE PRACTITIONER

## 2019-02-27 NOTE — PROGRESS NOTES
" HPI:   Lissa Tavarez is a 54 y.o. female for follow-up of right ICA, superior hypophyseal artery region aneurysm. She is status post cerebral angiogram with embolization in May 2006.  There is recanalization of the aneurysm with attempted embolization in January 2007.  This was aborted.  She underwent craniotomy with clipping in February 2007.  Since her last office visit she had cerebral angiogram on February 14, 2019. She was discharged to home. She has not had postoperative complications. No new headaches; she has chronic HA. She is on topiramate. No recent seizures.    She presents unaccompanied.    Review of Systems   Eyes: Positive for visual disturbance (floaters/blurred vision).   Cardiovascular: Negative for leg swelling.   Skin: Negative for color change (or coolness of procedure leg), pallor and wound (swelling, excess bruising or bleeding of groin site ).   Neurological: Positive for headaches (on topirimate). Negative for facial asymmetry, speech difficulty, weakness and numbness (of procedure leg).        /60 (BP Location: Left arm, Patient Position: Sitting, Cuff Size: Adult)   Pulse 88   Temp 99.3 °F (37.4 °C)   Resp 16   Ht 160 cm (63\")   Wt 55.3 kg (122 lb)   BMI 21.61 kg/m²     Physical Exam   Constitutional: She is oriented to person, place, and time. She appears well-developed and well-nourished.   Neck: Neck supple.   Cardiovascular: Intact distal pulses.   Pulmonary/Chest: Effort normal.   Musculoskeletal: She exhibits no edema.   Neurological: She is alert and oriented to person, place, and time. She has a normal Finger-Nose-Finger Test. Gait normal.   Skin: Skin is warm and dry. Capillary refill takes less than 2 seconds. No pallor.   Psychiatric: She has a normal mood and affect. Her speech is normal and behavior is normal. Judgment normal.   Vitals reviewed.    Neurologic Exam     Mental Status   Oriented to person, place, and time.   Follows 2 step commands.   Attention: " normal. Concentration: normal.   Speech: speech is normal   Level of consciousness: alert  Knowledge: good.   Normal comprehension.     Cranial Nerves   Cranial nerves II through XII intact.     Motor Exam   Right arm pronator drift: absent  Left arm pronator drift: absent    Gait, Coordination, and Reflexes     Gait  Gait: normal    Coordination   Finger to nose coordination: normal      Findings/Results:  Cerebral angiogram reviewed with Dr. Ko.  This reveals 100% obliteration of the previously treated aneurysm.    Assessment/Plan:  Lissa was seen today for cerebral aneurysm.    Diagnoses and all orders for this visit:    Cerebral aneurysm  -     CT Angiogram Head With & Without Contrast; Future      Discussion/Summary  Patient presents for follow-up after cerebral angiogram to reevaluate previously coiled and clipped right superior hypophyseal artery region aneurysm.  She denies any postprocedural issues.  Exam is as noted above with no neurologic changes.  Angiogram shows 100% obliteration of previously treated aneurysm.  We will plan to see her back in 5 years with CTA head.    Plan: Return in about 5 years (around 2/27/2024) for Follow-up with NP, with imaging.         Patient Care Team    Patient Care Team:  Aries Morejon MD as PCP - General  Aries Morejon MD as PCP - Family Medicine  Aries Morejon MD as PCP - Claims Attributed  Billy Pinedo MD as Consulting Physician (Neurology)    Fariba Munoz, APRN  2/27/2019    Dragon disclaimer:   Much of this encounter note is an electronic transcription/translation of spoken language to printed text. The electronic translation of spoken language may permit erroneous, or at times, nonsensical words or phrases to be inadvertently transcribed; Although I have reviewed the note for such errors, some may still exist.

## 2019-06-03 ENCOUNTER — OFFICE VISIT (OUTPATIENT)
Dept: FAMILY MEDICINE CLINIC | Facility: CLINIC | Age: 54
End: 2019-06-03

## 2019-06-03 VITALS
OXYGEN SATURATION: 98 % | HEART RATE: 81 BPM | SYSTOLIC BLOOD PRESSURE: 104 MMHG | BODY MASS INDEX: 21.14 KG/M2 | TEMPERATURE: 97.8 F | WEIGHT: 119.3 LBS | HEIGHT: 63 IN | RESPIRATION RATE: 16 BRPM | DIASTOLIC BLOOD PRESSURE: 70 MMHG

## 2019-06-03 DIAGNOSIS — J20.9 ACUTE BRONCHITIS, UNSPECIFIED ORGANISM: ICD-10-CM

## 2019-06-03 DIAGNOSIS — R05.9 COUGH: Primary | ICD-10-CM

## 2019-06-03 PROCEDURE — 99214 OFFICE O/P EST MOD 30 MIN: CPT | Performed by: INTERNAL MEDICINE

## 2019-06-03 PROCEDURE — 71046 X-RAY EXAM CHEST 2 VIEWS: CPT | Performed by: INTERNAL MEDICINE

## 2019-06-03 RX ORDER — AZITHROMYCIN 250 MG/1
TABLET, FILM COATED ORAL
Qty: 6 TABLET | Refills: 0 | Status: SHIPPED | OUTPATIENT
Start: 2019-06-03 | End: 2019-08-20

## 2019-06-03 RX ORDER — ATORVASTATIN CALCIUM 80 MG/1
80 TABLET, FILM COATED ORAL NIGHTLY
Qty: 30 TABLET | Refills: 2 | Status: SHIPPED | OUTPATIENT
Start: 2019-06-03 | End: 2019-08-12 | Stop reason: SDUPTHER

## 2019-06-03 NOTE — PROGRESS NOTES
Subjective   Lissa Tavarez is a 54 y.o. female. Patient is here today for   Chief Complaint   Patient presents with   • Fever     PT STATES HAS BEEN FEELING THIS WAY SINCE APRIL    • Chills   • Cough   • GLOOMY          Vitals:    19 1034   BP: 104/70   Pulse: 81   Resp: 16   Temp: 97.8 °F (36.6 °C)   SpO2: 98%       Past Medical History:   Diagnosis Date   • Anxiety    • Cerebral arterial aneurysm    • Depression    • Epilepsy (CMS/HCC)     LAST SEIZURE    • Gestational diabetes    • H/O breast biopsy    • Hyperglycemia    • Hyperlipidemia    • Migraines    • PONV (postoperative nausea and vomiting)    • Spinal headache     FROM EPIDURAL HAD BLOOD PATCH X 2      Allergies   Allergen Reactions   • Codeine Phosphate [Codeine] Hives     SWELLING OF THROAT   • Sulfa Antibiotics Hives     ITCHING      Social History     Socioeconomic History   • Marital status:      Spouse name: Not on file   • Number of children: Not on file   • Years of education: Not on file   • Highest education level: Not on file   Occupational History   • Occupation: disabled   Tobacco Use   • Smoking status: Former Smoker     Packs/day: 0.50     Types: Cigarettes     Last attempt to quit:      Years since quittin.4   • Smokeless tobacco: Never Used   Substance and Sexual Activity   • Alcohol use: No   • Drug use: No   • Sexual activity: Defer        Current Outpatient Medications:   •  atorvastatin (LIPITOR) 80 MG tablet, Take 1 tablet by mouth Every Night., Disp: 30 tablet, Rfl: 2  •  Topiramate ER 50 MG capsule sustained-release 24 hr, Take 3 at bedtime (Patient taking differently: Take 50 mg by mouth 2 (Two) Times a Day. One tablet twice a day), Disp: 90 capsule, Rfl: 5  •  azithromycin (ZITHROMAX) 250 MG tablet, Take 2 tablets the first day, then 1 tablet daily for 4 days., Disp: 6 tablet, Rfl: 0     Objective     She continues to smoke cigarettes.  Complains of a productive cough for the last 6 weeks.    She  denies dyspnea.    She has had the blues lately but is coming up on the anniversary of the death of her .         Review of Systems   Constitutional: Negative.    HENT: Negative.    Respiratory: Positive for cough. Negative for choking, shortness of breath, wheezing and stridor.    Cardiovascular: Negative.    Psychiatric/Behavioral: Positive for dysphoric mood.       Physical Exam   Constitutional: She is oriented to person, place, and time. She appears well-developed and well-nourished.   Pleasant, neatly groomed, BMI 21.   HENT:   Head: Normocephalic and atraumatic.   Cardiovascular: Normal rate, regular rhythm and normal heart sounds.   Pulmonary/Chest: Effort normal and breath sounds normal. No stridor. No respiratory distress. She has no wheezes.   Neurological: She is alert and oriented to person, place, and time.   Psychiatric: She has a normal mood and affect. Her behavior is normal.   Nursing note and vitals reviewed.        Problem List Items Addressed This Visit        Respiratory    Acute bronchitis      Other Visit Diagnoses     Cough    -  Primary    Relevant Orders    XR Chest PA & Lateral (In Office)        PA and lateral chest x-ray show no acute or chronic changes.    PLAN  To send out a prescription for Zithromax to take as directed, I asked her to restart atorvastatin, and I asked her to follow-up in about 3 to 4 months to recheck a lipid and a comprehensive metabolic panel.  If her cough is persistent, she will let me know.  No Follow-up on file.

## 2019-08-12 RX ORDER — ATORVASTATIN CALCIUM 80 MG/1
80 TABLET, FILM COATED ORAL NIGHTLY
Qty: 30 TABLET | Refills: 2 | Status: SHIPPED | OUTPATIENT
Start: 2019-08-12 | End: 2019-12-09

## 2019-08-20 ENCOUNTER — OFFICE VISIT (OUTPATIENT)
Dept: FAMILY MEDICINE CLINIC | Facility: CLINIC | Age: 54
End: 2019-08-20

## 2019-08-20 VITALS
RESPIRATION RATE: 17 BRPM | OXYGEN SATURATION: 98 % | SYSTOLIC BLOOD PRESSURE: 100 MMHG | HEIGHT: 63 IN | HEART RATE: 86 BPM | TEMPERATURE: 97.1 F | WEIGHT: 120 LBS | BODY MASS INDEX: 21.26 KG/M2 | DIASTOLIC BLOOD PRESSURE: 70 MMHG

## 2019-08-20 DIAGNOSIS — R19.7 DIARRHEA, UNSPECIFIED TYPE: Primary | ICD-10-CM

## 2019-08-20 LAB
ALBUMIN SERPL-MCNC: 4.7 G/DL (ref 3.5–5.2)
ALBUMIN/GLOB SERPL: 1.7 G/DL
ALP SERPL-CCNC: 77 U/L (ref 39–117)
ALT SERPL-CCNC: 15 U/L (ref 1–33)
AST SERPL-CCNC: 13 U/L (ref 1–32)
BASOPHILS # BLD AUTO: 0.07 10*3/MM3 (ref 0–0.2)
BASOPHILS NFR BLD AUTO: 0.7 % (ref 0–1.5)
BILIRUB SERPL-MCNC: 0.4 MG/DL (ref 0.2–1.2)
BUN SERPL-MCNC: 9 MG/DL (ref 6–20)
BUN/CREAT SERPL: 10 (ref 7–25)
CALCIUM SERPL-MCNC: 9.4 MG/DL (ref 8.6–10.5)
CHLORIDE SERPL-SCNC: 105 MMOL/L (ref 98–107)
CO2 SERPL-SCNC: 24.3 MMOL/L (ref 22–29)
CREAT SERPL-MCNC: 0.9 MG/DL (ref 0.57–1)
EOSINOPHIL # BLD AUTO: 0.11 10*3/MM3 (ref 0–0.4)
EOSINOPHIL NFR BLD AUTO: 1.1 % (ref 0.3–6.2)
ERYTHROCYTE [DISTWIDTH] IN BLOOD BY AUTOMATED COUNT: 13.7 % (ref 12.3–15.4)
GLOBULIN SER CALC-MCNC: 2.7 GM/DL
GLUCOSE SERPL-MCNC: 103 MG/DL (ref 65–99)
HCT VFR BLD AUTO: 46.7 % (ref 34–46.6)
HGB BLD-MCNC: 14.6 G/DL (ref 12–15.9)
IMM GRANULOCYTES # BLD AUTO: 0.05 10*3/MM3 (ref 0–0.05)
IMM GRANULOCYTES NFR BLD AUTO: 0.5 % (ref 0–0.5)
LYMPHOCYTES # BLD AUTO: 2.66 10*3/MM3 (ref 0.7–3.1)
LYMPHOCYTES NFR BLD AUTO: 27.6 % (ref 19.6–45.3)
MCH RBC QN AUTO: 30.4 PG (ref 26.6–33)
MCHC RBC AUTO-ENTMCNC: 31.3 G/DL (ref 31.5–35.7)
MCV RBC AUTO: 97.1 FL (ref 79–97)
MONOCYTES # BLD AUTO: 0.64 10*3/MM3 (ref 0.1–0.9)
MONOCYTES NFR BLD AUTO: 6.6 % (ref 5–12)
NEUTROPHILS # BLD AUTO: 6.12 10*3/MM3 (ref 1.7–7)
NEUTROPHILS NFR BLD AUTO: 63.5 % (ref 42.7–76)
NRBC BLD AUTO-RTO: 0.1 /100 WBC (ref 0–0.2)
PLATELET # BLD AUTO: 338 10*3/MM3 (ref 140–450)
POTASSIUM SERPL-SCNC: 4.7 MMOL/L (ref 3.5–5.2)
PROT SERPL-MCNC: 7.4 G/DL (ref 6–8.5)
RBC # BLD AUTO: 4.81 10*6/MM3 (ref 3.77–5.28)
SODIUM SERPL-SCNC: 142 MMOL/L (ref 136–145)
TSH SERPL DL<=0.005 MIU/L-ACNC: 1.76 MIU/ML (ref 0.27–4.2)
WBC # BLD AUTO: 9.65 10*3/MM3 (ref 3.4–10.8)

## 2019-08-20 PROCEDURE — 99214 OFFICE O/P EST MOD 30 MIN: CPT | Performed by: INTERNAL MEDICINE

## 2019-08-20 NOTE — PATIENT INSTRUCTIONS
Will check some labs today and obtain stool studies.  You need to decrease caffeine intake significantly.

## 2019-08-20 NOTE — PROGRESS NOTES
Subjective   Lissa Tavarez is a 54 y.o. female. Patient is here today for   Chief Complaint   Patient presents with   • Diarrhea     x 3 months          Vitals:    19 1107   BP: 100/70   Pulse: 86   Resp: 17   Temp: 97.1 °F (36.2 °C)   SpO2: 98%     The following portions of the patient's history were reviewed and updated as appropriate: allergies, current medications, past family history, past medical history, past social history, past surgical history and problem list.    Past Medical History:   Diagnosis Date   • Anxiety    • Cerebral arterial aneurysm    • Depression    • Epilepsy (CMS/HCC) 2012    LAST SEIZURE 2017   • Gestational diabetes    • H/O breast biopsy    • Hyperglycemia    • Hyperlipidemia    • Migraines    • PONV (postoperative nausea and vomiting)    • Spinal headache     FROM EPIDURAL HAD BLOOD PATCH X 2      Allergies   Allergen Reactions   • Codeine Phosphate [Codeine] Hives     SWELLING OF THROAT   • Sulfa Antibiotics Hives     ITCHING      Social History     Socioeconomic History   • Marital status:      Spouse name: Not on file   • Number of children: Not on file   • Years of education: Not on file   • Highest education level: Not on file   Occupational History   • Occupation: disabled   Tobacco Use   • Smoking status: Former Smoker     Packs/day: 0.50     Types: Cigarettes     Last attempt to quit: 2013     Years since quittin.6   • Smokeless tobacco: Never Used   Substance and Sexual Activity   • Alcohol use: No   • Drug use: No   • Sexual activity: Defer        Current Outpatient Medications:   •  atorvastatin (LIPITOR) 80 MG tablet, Take 1 tablet by mouth Every Night., Disp: 30 tablet, Rfl: 2  •  topiramate (TOPAMAX) 50 MG tablet, , Disp: , Rfl:      Objective     History of Present Illness Lissa complains of diarrhea that started 3 months ago.  She has 4 to 5 watery stools daily and recently has been getting up in the middle night with diarrhea.  She denies any  abdominal pain, weight loss, or blood in her stool.  She is having some nausea.  She has not been on any recent antibiotics.  She states that she drinks a lot of strong coffee.  She had screening colonoscopy in 2016 which was normal.    Review of Systems   Constitutional: Negative for unexpected weight change.   Respiratory: Negative.    Cardiovascular: Negative.    Gastrointestinal: Positive for diarrhea and nausea. Negative for blood in stool.   Neurological: Negative.        Physical Exam   Constitutional: She appears well-developed and well-nourished.   Cardiovascular: Normal rate, regular rhythm and normal heart sounds.   Pulmonary/Chest: Effort normal and breath sounds normal.   Abdominal: Soft. Bowel sounds are normal.   There is minimal upper abdominal tenderness   Psychiatric: She has a normal mood and affect. Her behavior is normal. Judgment and thought content normal.   Vitals reviewed.      ASSESSMENT     Problem List Items Addressed This Visit        Digestive    Diarrhea - Primary    Relevant Orders    Comprehensive Metabolic Panel    CBC & Differential    TSH    Stool Culture - Stool, Per Rectum    Ova & Parasite Examination - Stool, Per Rectum          PLAN  Patient Instructions   Will check some labs today and obtain stool studies.  You need to decrease caffeine intake significantly.    Return if symptoms worsen or fail to improve.

## 2019-08-26 LAB
BACTERIA SPEC CULT: NORMAL
BACTERIA SPEC CULT: NORMAL
CAMPYLOBACTER STL CULT: NORMAL
E COLI SXT STL QL IA: NEGATIVE
O+P SPEC MICRO: NORMAL
O+P STL CONC: NORMAL
SALM + SHIG STL CULT: NORMAL

## 2019-11-27 DIAGNOSIS — E78.00 ELEVATED CHOLESTEROL: ICD-10-CM

## 2019-11-27 DIAGNOSIS — Z11.59 NEED FOR HEPATITIS C SCREENING TEST: ICD-10-CM

## 2019-11-27 DIAGNOSIS — E78.5 HYPERLIPIDEMIA, UNSPECIFIED HYPERLIPIDEMIA TYPE: Primary | ICD-10-CM

## 2019-12-03 LAB
ALBUMIN SERPL-MCNC: 4.5 G/DL (ref 3.5–5.2)
ALBUMIN/GLOB SERPL: 1.7 G/DL
ALP SERPL-CCNC: 91 U/L (ref 39–117)
ALT SERPL-CCNC: 15 U/L (ref 1–33)
AST SERPL-CCNC: 13 U/L (ref 1–32)
BASOPHILS # BLD AUTO: 0.1 10*3/MM3 (ref 0–0.2)
BASOPHILS NFR BLD AUTO: 1.2 % (ref 0–1.5)
BILIRUB SERPL-MCNC: 0.2 MG/DL (ref 0.2–1.2)
BUN SERPL-MCNC: 13 MG/DL (ref 6–20)
BUN/CREAT SERPL: 13.7 (ref 7–25)
CALCIUM SERPL-MCNC: 9.5 MG/DL (ref 8.6–10.5)
CHLORIDE SERPL-SCNC: 106 MMOL/L (ref 98–107)
CHOLEST SERPL-MCNC: 276 MG/DL (ref 0–200)
CO2 SERPL-SCNC: 26 MMOL/L (ref 22–29)
CREAT SERPL-MCNC: 0.95 MG/DL (ref 0.57–1)
EOSINOPHIL # BLD AUTO: 0.11 10*3/MM3 (ref 0–0.4)
EOSINOPHIL NFR BLD AUTO: 1.3 % (ref 0.3–6.2)
ERYTHROCYTE [DISTWIDTH] IN BLOOD BY AUTOMATED COUNT: 12.7 % (ref 12.3–15.4)
GLOBULIN SER CALC-MCNC: 2.6 GM/DL
GLUCOSE SERPL-MCNC: 108 MG/DL (ref 65–99)
HBA1C MFR BLD: 6 % (ref 4.8–5.6)
HCT VFR BLD AUTO: 43.1 % (ref 34–46.6)
HDLC SERPL-MCNC: 39 MG/DL (ref 40–60)
HGB BLD-MCNC: 14 G/DL (ref 12–15.9)
IMM GRANULOCYTES # BLD AUTO: 0.03 10*3/MM3 (ref 0–0.05)
IMM GRANULOCYTES NFR BLD AUTO: 0.3 % (ref 0–0.5)
LDLC SERPL CALC-MCNC: 208 MG/DL (ref 0–100)
LDLC/HDLC SERPL: 5.33 {RATIO}
LYMPHOCYTES # BLD AUTO: 2.15 10*3/MM3 (ref 0.7–3.1)
LYMPHOCYTES NFR BLD AUTO: 24.8 % (ref 19.6–45.3)
MCH RBC QN AUTO: 30 PG (ref 26.6–33)
MCHC RBC AUTO-ENTMCNC: 32.5 G/DL (ref 31.5–35.7)
MCV RBC AUTO: 92.5 FL (ref 79–97)
MONOCYTES # BLD AUTO: 0.54 10*3/MM3 (ref 0.1–0.9)
MONOCYTES NFR BLD AUTO: 6.2 % (ref 5–12)
NEUTROPHILS # BLD AUTO: 5.75 10*3/MM3 (ref 1.7–7)
NEUTROPHILS NFR BLD AUTO: 66.2 % (ref 42.7–76)
NRBC BLD AUTO-RTO: 0 /100 WBC (ref 0–0.2)
PLATELET # BLD AUTO: 382 10*3/MM3 (ref 140–450)
POTASSIUM SERPL-SCNC: 4.9 MMOL/L (ref 3.5–5.2)
PROT SERPL-MCNC: 7.1 G/DL (ref 6–8.5)
RBC # BLD AUTO: 4.66 10*6/MM3 (ref 3.77–5.28)
SODIUM SERPL-SCNC: 142 MMOL/L (ref 136–145)
TRIGL SERPL-MCNC: 146 MG/DL (ref 0–150)
VLDLC SERPL CALC-MCNC: 29.2 MG/DL
WBC # BLD AUTO: 8.68 10*3/MM3 (ref 3.4–10.8)

## 2019-12-09 ENCOUNTER — OFFICE VISIT (OUTPATIENT)
Dept: FAMILY MEDICINE CLINIC | Facility: CLINIC | Age: 54
End: 2019-12-09

## 2019-12-09 VITALS
RESPIRATION RATE: 18 BRPM | HEIGHT: 63 IN | BODY MASS INDEX: 21.76 KG/M2 | WEIGHT: 122.8 LBS | OXYGEN SATURATION: 98 % | DIASTOLIC BLOOD PRESSURE: 82 MMHG | TEMPERATURE: 98 F | SYSTOLIC BLOOD PRESSURE: 120 MMHG | HEART RATE: 85 BPM

## 2019-12-09 DIAGNOSIS — E78.00 ELEVATED CHOLESTEROL: Primary | ICD-10-CM

## 2019-12-09 PROCEDURE — 99213 OFFICE O/P EST LOW 20 MIN: CPT | Performed by: INTERNAL MEDICINE

## 2019-12-09 RX ORDER — AMPICILLIN TRIHYDRATE 250 MG
200 CAPSULE ORAL 2 TIMES DAILY
COMMUNITY

## 2019-12-15 RX ORDER — ATORVASTATIN CALCIUM 40 MG/1
40 TABLET, FILM COATED ORAL DAILY
Qty: 90 TABLET | Refills: 3 | Status: SHIPPED | OUTPATIENT
Start: 2019-12-15 | End: 2020-03-10

## 2019-12-15 NOTE — PROGRESS NOTES
Subjective   Lissa Tavarez is a 54 y.o. female. Patient is here today for   Chief Complaint   Patient presents with   • Hyperlipidemia          Vitals:    19 1031   BP: 120/82   Pulse: 85   Resp: 18   Temp: 98 °F (36.7 °C)   SpO2: 98%     Body mass index is 21.76 kg/m².      Past Medical History:   Diagnosis Date   • Anxiety    • Cerebral arterial aneurysm    • Depression    • Epilepsy (CMS/HCC)     LAST SEIZURE    • Gestational diabetes    • H/O breast biopsy    • Hyperglycemia    • Hyperlipidemia    • Migraines    • PONV (postoperative nausea and vomiting)    • Spinal headache     FROM EPIDURAL HAD BLOOD PATCH X 2      Allergies   Allergen Reactions   • Codeine Phosphate [Codeine] Hives     SWELLING OF THROAT   • Sulfa Antibiotics Hives     ITCHING      Social History     Socioeconomic History   • Marital status:      Spouse name: Not on file   • Number of children: Not on file   • Years of education: Not on file   • Highest education level: Not on file   Occupational History   • Occupation: disabled   Tobacco Use   • Smoking status: Former Smoker     Packs/day: 0.50     Types: Cigarettes     Last attempt to quit:      Years since quittin.9   • Smokeless tobacco: Never Used   Substance and Sexual Activity   • Alcohol use: No   • Drug use: No   • Sexual activity: Defer        Current Outpatient Medications:   •  Coenzyme Q10 (COQ10) 200 MG capsule, Take 200 mg by mouth 2 (Two) Times a Day., Disp: , Rfl:   •  magnesium oxide (MAGOX) 400 (241.3 Mg) MG tablet tablet, Take 400 mg by mouth Daily., Disp: , Rfl:   •  topiramate (TOPAMAX) 200 MG tablet, Take 200 mg by mouth Daily., Disp: , Rfl:   •  atorvastatin (LIPITOR) 40 MG tablet, Take 1 tablet by mouth Daily., Disp: 90 tablet, Rfl: 3     Objective     She is here to follow-up on hypercholesterolemia.    No complaints.       Review of Systems   Constitutional: Negative.    HENT: Negative.    Respiratory: Negative.    Cardiovascular:  Negative.    Musculoskeletal: Negative.    Psychiatric/Behavioral: Negative.        Physical Exam   Constitutional: She is oriented to person, place, and time. She appears well-developed and well-nourished.   HENT:   Head: Normocephalic and atraumatic.   Pulmonary/Chest: Effort normal and breath sounds normal.   Neurological: She is alert and oriented to person, place, and time.   Psychiatric: She has a normal mood and affect. Her behavior is normal.   Nursing note and vitals reviewed.        Problem List Items Addressed This Visit        Cardiovascular and Mediastinum    Elevated cholesterol - Primary    Relevant Medications    atorvastatin (LIPITOR) 40 MG tablet            PLAN  She had inadvertently stopped taking her atorvastatin.  Her hypercholesterolemia is poorly controlled.  We will restart atorvastatin 40 mg daily.    I would like her back in 3 to 4 months with fasting labs prior to that visit to include: Lipid profile, comprehensive metabolic panel.    She should have a comprehensive physical examination once yearly.  No follow-ups on file.

## 2020-02-28 DIAGNOSIS — E78.00 ELEVATED CHOLESTEROL: Primary | ICD-10-CM

## 2020-03-07 LAB
ALBUMIN SERPL-MCNC: 4.4 G/DL (ref 3.5–5.2)
ALBUMIN/GLOB SERPL: 1.6 G/DL
ALP SERPL-CCNC: 85 U/L (ref 39–117)
ALT SERPL-CCNC: 13 U/L (ref 1–33)
AST SERPL-CCNC: 15 U/L (ref 1–32)
BILIRUB SERPL-MCNC: 0.5 MG/DL (ref 0.2–1.2)
BUN SERPL-MCNC: 13 MG/DL (ref 6–20)
BUN/CREAT SERPL: 14 (ref 7–25)
CALCIUM SERPL-MCNC: 9.3 MG/DL (ref 8.6–10.5)
CHLORIDE SERPL-SCNC: 105 MMOL/L (ref 98–107)
CHOLEST SERPL-MCNC: 298 MG/DL (ref 0–200)
CO2 SERPL-SCNC: 23.2 MMOL/L (ref 22–29)
CREAT SERPL-MCNC: 0.93 MG/DL (ref 0.57–1)
GLOBULIN SER CALC-MCNC: 2.8 GM/DL
GLUCOSE SERPL-MCNC: 103 MG/DL (ref 65–99)
HDLC SERPL-MCNC: 39 MG/DL (ref 40–60)
LDLC SERPL CALC-MCNC: 236 MG/DL (ref 0–100)
LDLC/HDLC SERPL: 6.06 {RATIO}
POTASSIUM SERPL-SCNC: 4.5 MMOL/L (ref 3.5–5.2)
PROT SERPL-MCNC: 7.2 G/DL (ref 6–8.5)
SODIUM SERPL-SCNC: 140 MMOL/L (ref 136–145)
TRIGL SERPL-MCNC: 114 MG/DL (ref 0–150)
VLDLC SERPL CALC-MCNC: 22.8 MG/DL

## 2020-03-10 ENCOUNTER — OFFICE VISIT (OUTPATIENT)
Dept: FAMILY MEDICINE CLINIC | Facility: CLINIC | Age: 55
End: 2020-03-10

## 2020-03-10 VITALS
HEART RATE: 85 BPM | RESPIRATION RATE: 18 BRPM | WEIGHT: 127 LBS | BODY MASS INDEX: 22.5 KG/M2 | DIASTOLIC BLOOD PRESSURE: 72 MMHG | TEMPERATURE: 98.2 F | OXYGEN SATURATION: 97 % | HEIGHT: 63 IN | SYSTOLIC BLOOD PRESSURE: 100 MMHG

## 2020-03-10 DIAGNOSIS — E78.00 ELEVATED CHOLESTEROL: ICD-10-CM

## 2020-03-10 DIAGNOSIS — Z23 ENCOUNTER FOR IMMUNIZATION: Primary | ICD-10-CM

## 2020-03-10 PROCEDURE — 99214 OFFICE O/P EST MOD 30 MIN: CPT | Performed by: INTERNAL MEDICINE

## 2020-03-10 PROCEDURE — 90686 IIV4 VACC NO PRSV 0.5 ML IM: CPT | Performed by: INTERNAL MEDICINE

## 2020-03-10 PROCEDURE — 90471 IMMUNIZATION ADMIN: CPT | Performed by: INTERNAL MEDICINE

## 2020-03-10 RX ORDER — TOPIRAMATE 50 MG/1
TABLET, FILM COATED ORAL
COMMUNITY
Start: 2020-02-17 | End: 2022-01-31

## 2020-03-10 RX ORDER — ATORVASTATIN CALCIUM 80 MG/1
40 TABLET, FILM COATED ORAL DAILY
Qty: 90 TABLET | Refills: 3 | Status: SHIPPED | OUTPATIENT
Start: 2020-03-10 | End: 2022-02-18

## 2020-03-10 NOTE — PROGRESS NOTES
Subjective   Lissa aTvarez is a 55 y.o. female. Patient is here today for   Chief Complaint   Patient presents with   • Hyperlipidemia     follow up on labs.           Vitals:    03/10/20 0939   BP: 100/72   Pulse: 85   Resp: 18   Temp: 98.2 °F (36.8 °C)   SpO2: 97%     Body mass index is 22.5 kg/m².      Past Medical History:   Diagnosis Date   • Anxiety    • Cerebral arterial aneurysm    • Depression    • Epilepsy (CMS/HCC)     LAST SEIZURE    • Gestational diabetes    • H/O breast biopsy    • Hyperglycemia    • Hyperlipidemia    • Migraines    • PONV (postoperative nausea and vomiting)    • Spinal headache     FROM EPIDURAL HAD BLOOD PATCH X 2      Allergies   Allergen Reactions   • Codeine Phosphate [Codeine] Hives     SWELLING OF THROAT   • Sulfa Antibiotics Hives     ITCHING      Social History     Socioeconomic History   • Marital status:      Spouse name: Not on file   • Number of children: Not on file   • Years of education: Not on file   • Highest education level: Not on file   Occupational History   • Occupation: disabled   Tobacco Use   • Smoking status: Former Smoker     Packs/day: 0.50     Types: Cigarettes     Last attempt to quit:      Years since quittin.1   • Smokeless tobacco: Never Used   Substance and Sexual Activity   • Alcohol use: No   • Drug use: No   • Sexual activity: Defer        Current Outpatient Medications:   •  atorvastatin (LIPITOR) 80 MG tablet, Take 0.5 tablets by mouth Daily., Disp: 90 tablet, Rfl: 3  •  magnesium oxide (MAGOX) 400 (241.3 Mg) MG tablet tablet, Take 400 mg by mouth Daily., Disp: , Rfl:   •  topiramate (TOPAMAX) 200 MG tablet, Take 200 mg by mouth Daily., Disp: , Rfl:   •  Coenzyme Q10 (COQ10) 200 MG capsule, Take 200 mg by mouth 2 (Two) Times a Day., Disp: , Rfl:   •  topiramate (TOPAMAX) 50 MG tablet, , Disp: , Rfl:      Objective     She is here to follow-up on hyper lipidemia.    She tells me that she is taking atorvastatin 40 mg  daily.    She says that she feels well.       Review of Systems   Constitutional: Negative.    HENT: Negative.    Respiratory: Negative.    Cardiovascular: Negative.    Musculoskeletal: Negative.    Psychiatric/Behavioral: Negative.        Physical Exam   Constitutional: She is oriented to person, place, and time. She appears well-developed and well-nourished.   Pleasant, neatly groomed, BMI 22.5.   HENT:   Head: Normocephalic and atraumatic.   Right Ear: External ear normal.   Left Ear: External ear normal.   Mouth/Throat: Oropharynx is clear and moist.   Cardiovascular: Normal rate, regular rhythm and normal heart sounds.   Pulmonary/Chest: Effort normal and breath sounds normal.   Neurological: She is alert and oriented to person, place, and time.   Psychiatric: She has a normal mood and affect. Her behavior is normal. Thought content normal.   Nursing note and vitals reviewed.        Problem List Items Addressed This Visit        Cardiovascular and Mediastinum    Elevated cholesterol    Relevant Medications    atorvastatin (LIPITOR) 80 MG tablet      Other Visit Diagnoses     Encounter for immunization    -  Primary    Relevant Orders    Fluarix/Fluzone/Afluria/FluLaval ([id number]) (Completed)            PLAN  And I reviewed her labs.  Her cholesterol is too high.  I asked her to start taking 80 mg of atorvastatin daily.    I like to have her back in about 3 months to recheck a lipid and a comprehensive metabolic panel.    We gave her a flu shot today.  No follow-ups on file.

## 2020-06-02 DIAGNOSIS — E78.00 ELEVATED CHOLESTEROL: Primary | ICD-10-CM

## 2020-06-05 ENCOUNTER — TELEPHONE (OUTPATIENT)
Dept: FAMILY MEDICINE CLINIC | Facility: CLINIC | Age: 55
End: 2020-06-05

## 2020-06-08 ENCOUNTER — RESULTS ENCOUNTER (OUTPATIENT)
Dept: FAMILY MEDICINE CLINIC | Facility: CLINIC | Age: 55
End: 2020-06-08

## 2020-06-08 DIAGNOSIS — E78.00 ELEVATED CHOLESTEROL: ICD-10-CM

## 2020-10-08 ENCOUNTER — OFFICE VISIT (OUTPATIENT)
Dept: FAMILY MEDICINE CLINIC | Facility: CLINIC | Age: 55
End: 2020-10-08

## 2020-10-08 VITALS
HEIGHT: 63 IN | HEART RATE: 80 BPM | SYSTOLIC BLOOD PRESSURE: 109 MMHG | TEMPERATURE: 98 F | RESPIRATION RATE: 16 BRPM | DIASTOLIC BLOOD PRESSURE: 69 MMHG | BODY MASS INDEX: 22.43 KG/M2 | WEIGHT: 126.6 LBS | OXYGEN SATURATION: 97 %

## 2020-10-08 DIAGNOSIS — Z23 NEED FOR INFLUENZA VACCINATION: ICD-10-CM

## 2020-10-08 DIAGNOSIS — M79.10 TRIGGER POINT: ICD-10-CM

## 2020-10-08 DIAGNOSIS — M79.18 MYOFASCIAL MUSCLE PAIN: Primary | ICD-10-CM

## 2020-10-08 PROBLEM — H91.90 HEARING LOSS: Status: ACTIVE | Noted: 2020-10-08

## 2020-10-08 PROBLEM — R73.9 HYPERGLYCEMIA: Status: ACTIVE | Noted: 2020-10-08

## 2020-10-08 PROBLEM — F32.A DEPRESSION: Status: ACTIVE | Noted: 2020-10-08

## 2020-10-08 PROBLEM — L98.9 SKIN LESION: Status: ACTIVE | Noted: 2020-10-08

## 2020-10-08 PROBLEM — B35.4 TINEA CORPORIS: Status: ACTIVE | Noted: 2020-10-08

## 2020-10-08 PROBLEM — H66.90 OTITIS MEDIA: Status: ACTIVE | Noted: 2020-10-08

## 2020-10-08 PROBLEM — J34.89 NASAL DISCHARGE: Status: ACTIVE | Noted: 2020-10-08

## 2020-10-08 PROCEDURE — 90471 IMMUNIZATION ADMIN: CPT | Performed by: NURSE PRACTITIONER

## 2020-10-08 PROCEDURE — 90686 IIV4 VACC NO PRSV 0.5 ML IM: CPT | Performed by: NURSE PRACTITIONER

## 2020-10-08 PROCEDURE — 99213 OFFICE O/P EST LOW 20 MIN: CPT | Performed by: NURSE PRACTITIONER

## 2020-10-08 NOTE — PROGRESS NOTES
Subjective     Lissa Tavarez is a 55 y.o.. female.     Pt c/o right upper outer leg lump which she noticed about 3 weeks ago. Pt stating it was a small lump at that time. Pt stating that two days ago she noticed it had enlarged and was tender now. Pt stating she has been walking more, has been painting and has been using a ladder recently. Pt denies redness and swelling to area. Pt stating she has a hx of cysts. Pt stating she had a cyst to right ankle that was removed in 2007.      The following portions of the patient's history were reviewed and updated as appropriate: allergies, current medications, past family history, past medical history, past social history, past surgical history and problem list.    Past Medical History:   Diagnosis Date   • Anxiety    • Cerebral arterial aneurysm    • Depression    • Epilepsy (CMS/HCC) 2012    LAST SEIZURE 2017   • Gestational diabetes    • H/O breast biopsy    • Hyperglycemia    • Hyperlipidemia    • Migraines    • PONV (postoperative nausea and vomiting)    • Spinal headache 1986    FROM EPIDURAL HAD BLOOD PATCH X 2       Past Surgical History:   Procedure Laterality Date   • BREAST BIOPSY Right 2018    MARKER IN PLACE   • CEREBRAL ANGIOGRAM N/A 2/14/2019    Procedure: CEREBRAL ANGIOGRAM;  Surgeon: Wayne Ko MD;  Location: [facility] OR 18/19;  Service: Neurosurgery   • CRANIOTOMY  [id number]    cerebral aneurysm w/ intraoperative angiography @ [facility]; total of 4 surgeries   • ENDOMETRIAL ABLATION  2003   • OTHER SURGICAL HISTORY      TRANSCATHETER OCCLUSION INTRACRANIAL TO THROMBOSE ANEURYSM   • TONSILLECTOMY     • TUBAL ABDOMINAL LIGATION  1991       Review of Systems   Constitutional: Negative for fever.   Musculoskeletal: Negative for gait problem.   Skin:        Lump noted to right upper lateral side of leg   Neurological: Negative for numbness.       Allergies   Allergen Reactions   • Codeine Phosphate [Codeine] Hives     SWELLING OF THROAT   • Sulfa  "Antibiotics Hives     ITCHING       Objective     Vitals:    10/08/20 1025   BP: 109/69   BP Location: Left arm   Patient Position: Sitting   Cuff Size: Adult   Pulse: 80   Resp: 16   Temp: 98 °F (36.7 °C)   SpO2: 97%   Weight: 57.4 kg (126 lb 9.6 oz)   Height: 160 cm (62.99\")     Body mass index is 22.43 kg/m².    Physical Exam  Vitals signs reviewed.   HENT:      Head: Normocephalic.   Eyes:      Pupils: Pupils are equal, round, and reactive to light.   Cardiovascular:      Rate and Rhythm: Normal rate and regular rhythm.   Pulmonary:      Effort: Pulmonary effort is normal.      Breath sounds: Normal breath sounds.   Musculoskeletal: Normal range of motion.   Skin:     Comments: Right lateral quadricep: small trigger points noted, no redness noted, no swelling noted, tender to palpation   Neurological:      Mental Status: She is alert and oriented to person, place, and time.   Psychiatric:         Behavior: Behavior normal.           Current Outpatient Medications:   •  atorvastatin (LIPITOR) 80 MG tablet, Take 0.5 tablets by mouth Daily., Disp: 90 tablet, Rfl: 3  •  magnesium oxide (MAGOX) 400 (241.3 Mg) MG tablet tablet, Take 400 mg by mouth Daily., Disp: , Rfl:   •  topiramate (TOPAMAX) 200 MG tablet, Take 200 mg by mouth Daily., Disp: , Rfl:   •  Coenzyme Q10 (COQ10) 200 MG capsule, Take 200 mg by mouth 2 (Two) Times a Day., Disp: , Rfl:   •  topiramate (TOPAMAX) 50 MG tablet, , Disp: , Rfl:       Assessment/Plan   Diagnoses and all orders for this visit:    1. Myofascial muscle pain (Primary)    2. Trigger point    3. Need for influenza vaccination  -     Fluarix/Fluzone/Afluria Quad>6 Months        Patient Instructions   May use cold compress/ice pack 10-15 minutes at a time several times a day; May use warm compress/heating pad 10-15 minutes at at time several times a day; May use over the counter biofreeze as needed (wash off from skin before using heating pad); Stretch right leg 2-3 times a day and may " do light massage to location as well.       Return for Dr. Morejon as needed/as recommended.

## 2020-10-08 NOTE — PATIENT INSTRUCTIONS
May use cold compress/ice pack 10-15 minutes at a time several times a day; May use warm compress/heating pad 10-15 minutes at at time several times a day; May use over the counter biofreeze as needed (wash off from skin before using heating pad); Stretch right leg 2-3 times a day and may do light massage to location as well.

## 2021-03-24 ENCOUNTER — BULK ORDERING (OUTPATIENT)
Dept: CASE MANAGEMENT | Facility: OTHER | Age: 56
End: 2021-03-24

## 2021-03-24 DIAGNOSIS — Z23 IMMUNIZATION DUE: ICD-10-CM

## 2022-01-31 ENCOUNTER — OFFICE VISIT (OUTPATIENT)
Dept: FAMILY MEDICINE CLINIC | Facility: CLINIC | Age: 57
End: 2022-01-31

## 2022-01-31 VITALS
RESPIRATION RATE: 18 BRPM | HEIGHT: 63 IN | BODY MASS INDEX: 23.92 KG/M2 | TEMPERATURE: 97.5 F | OXYGEN SATURATION: 97 % | DIASTOLIC BLOOD PRESSURE: 74 MMHG | SYSTOLIC BLOOD PRESSURE: 126 MMHG | WEIGHT: 135 LBS | HEART RATE: 64 BPM

## 2022-01-31 DIAGNOSIS — L65.9 HAIR LOSS: ICD-10-CM

## 2022-01-31 DIAGNOSIS — E28.39 OTHER PRIMARY OVARIAN FAILURE: ICD-10-CM

## 2022-01-31 DIAGNOSIS — Z00.00 MEDICARE ANNUAL WELLNESS VISIT, SUBSEQUENT: ICD-10-CM

## 2022-01-31 DIAGNOSIS — R73.9 HYPERGLYCEMIA: ICD-10-CM

## 2022-01-31 DIAGNOSIS — E78.00 HYPERCHOLESTEROLEMIA: ICD-10-CM

## 2022-01-31 DIAGNOSIS — Z12.31 ENCOUNTER FOR SCREENING MAMMOGRAM FOR MALIGNANT NEOPLASM OF BREAST: Primary | ICD-10-CM

## 2022-01-31 PROCEDURE — 1170F FXNL STATUS ASSESSED: CPT | Performed by: INTERNAL MEDICINE

## 2022-01-31 PROCEDURE — G0439 PPPS, SUBSEQ VISIT: HCPCS | Performed by: INTERNAL MEDICINE

## 2022-01-31 PROCEDURE — 1159F MED LIST DOCD IN RCRD: CPT | Performed by: INTERNAL MEDICINE

## 2022-01-31 RX ORDER — MELATONIN
1000 DAILY
COMMUNITY

## 2022-01-31 RX ORDER — TOPIRAMATE 50 MG/1
100 TABLET, FILM COATED ORAL 2 TIMES DAILY
Qty: 360 TABLET | Refills: 1 | Status: SHIPPED | OUTPATIENT
Start: 2022-01-31 | End: 2022-10-07

## 2022-02-03 ENCOUNTER — TRANSCRIBE ORDERS (OUTPATIENT)
Dept: ADMINISTRATIVE | Facility: HOSPITAL | Age: 57
End: 2022-02-03

## 2022-02-03 DIAGNOSIS — Z92.89 HISTORY OF MAMMOGRAPHY, SCREENING: Primary | ICD-10-CM

## 2022-02-10 LAB
ALBUMIN SERPL-MCNC: 4.4 G/DL (ref 3.8–4.9)
ALBUMIN/GLOB SERPL: 1.6 {RATIO} (ref 1.2–2.2)
ALP SERPL-CCNC: 95 IU/L (ref 44–121)
ALT SERPL-CCNC: 17 IU/L (ref 0–32)
AST SERPL-CCNC: 18 IU/L (ref 0–40)
BASOPHILS # BLD AUTO: 0.1 X10E3/UL (ref 0–0.2)
BASOPHILS NFR BLD AUTO: 1 %
BILIRUB SERPL-MCNC: 0.4 MG/DL (ref 0–1.2)
BUN SERPL-MCNC: 11 MG/DL (ref 6–24)
BUN/CREAT SERPL: 14 (ref 9–23)
CALCIUM SERPL-MCNC: 9.7 MG/DL (ref 8.7–10.2)
CHLORIDE SERPL-SCNC: 102 MMOL/L (ref 96–106)
CHOLEST SERPL-MCNC: 293 MG/DL (ref 100–199)
CO2 SERPL-SCNC: 23 MMOL/L (ref 20–29)
CREAT SERPL-MCNC: 0.81 MG/DL (ref 0.57–1)
EOSINOPHIL # BLD AUTO: 0.1 X10E3/UL (ref 0–0.4)
EOSINOPHIL NFR BLD AUTO: 1 %
ERYTHROCYTE [DISTWIDTH] IN BLOOD BY AUTOMATED COUNT: 12.6 % (ref 11.7–15.4)
GLOBULIN SER CALC-MCNC: 2.7 G/DL (ref 1.5–4.5)
GLUCOSE SERPL-MCNC: 101 MG/DL (ref 65–99)
HCT VFR BLD AUTO: 45 % (ref 34–46.6)
HDLC SERPL-MCNC: 40 MG/DL
HGB BLD-MCNC: 14.8 G/DL (ref 11.1–15.9)
IMM GRANULOCYTES # BLD AUTO: 0.1 X10E3/UL (ref 0–0.1)
IMM GRANULOCYTES NFR BLD AUTO: 1 %
IRON SATN MFR SERPL: 38 % (ref 15–55)
IRON SERPL-MCNC: 120 UG/DL (ref 27–159)
LDLC SERPL CALC-MCNC: 230 MG/DL (ref 0–99)
LYMPHOCYTES # BLD AUTO: 2.4 X10E3/UL (ref 0.7–3.1)
LYMPHOCYTES NFR BLD AUTO: 26 %
MCH RBC QN AUTO: 29.8 PG (ref 26.6–33)
MCHC RBC AUTO-ENTMCNC: 32.9 G/DL (ref 31.5–35.7)
MCV RBC AUTO: 91 FL (ref 79–97)
MONOCYTES # BLD AUTO: 0.5 X10E3/UL (ref 0.1–0.9)
MONOCYTES NFR BLD AUTO: 5 %
NEUTROPHILS # BLD AUTO: 6.3 X10E3/UL (ref 1.4–7)
NEUTROPHILS NFR BLD AUTO: 66 %
PLATELET # BLD AUTO: 390 X10E3/UL (ref 150–450)
POTASSIUM SERPL-SCNC: 4.9 MMOL/L (ref 3.5–5.2)
PROT SERPL-MCNC: 7.1 G/DL (ref 6–8.5)
RBC # BLD AUTO: 4.97 X10E6/UL (ref 3.77–5.28)
SODIUM SERPL-SCNC: 141 MMOL/L (ref 134–144)
TIBC SERPL-MCNC: 319 UG/DL (ref 250–450)
TRIGL SERPL-MCNC: 124 MG/DL (ref 0–149)
TSH SERPL DL<=0.005 MIU/L-ACNC: 1.37 UIU/ML (ref 0.45–4.5)
UIBC SERPL-MCNC: 199 UG/DL (ref 131–425)
VLDLC SERPL CALC-MCNC: 23 MG/DL (ref 5–40)
WBC # BLD AUTO: 9.5 X10E3/UL (ref 3.4–10.8)

## 2022-02-18 RX ORDER — ATORVASTATIN CALCIUM 40 MG/1
TABLET, FILM COATED ORAL
Qty: 90 TABLET | Refills: 0 | Status: SHIPPED | OUTPATIENT
Start: 2022-02-18 | End: 2022-10-07

## 2022-02-18 NOTE — TELEPHONE ENCOUNTER
Rx Refill Note  Requested Prescriptions     Pending Prescriptions Disp Refills   • atorvastatin (LIPITOR) 40 MG tablet [Pharmacy Med Name: Atorvastatin Calcium 40 MG Oral Tablet] 90 tablet 0     Sig: Take 1 tablet by mouth once daily      Last office visit with prescribing clinician: 1/31/2022      Next office visit with prescribing clinician: 7/29/2022            Sharmila Elena MA  02/18/22, 10:27 EST

## 2022-08-01 ENCOUNTER — TELEPHONE (OUTPATIENT)
Dept: FAMILY MEDICINE CLINIC | Facility: CLINIC | Age: 57
End: 2022-08-01

## 2022-08-01 NOTE — TELEPHONE ENCOUNTER
Caller: Lissa Tavarez    Relationship to patient: Self    Best call back number: [phone] ([phone])    Patient is needing: TO RESCHEDULE A LAB APPOINTMENT ABOUT ONE WEEK BEFORE HER FOLLOW UP WITH DR. PRINCE. PATIENT STATES SHE HAS NO TIME/DAY RESTRICTIONS.

## 2022-08-03 ENCOUNTER — APPOINTMENT (OUTPATIENT)
Dept: MAMMOGRAPHY | Facility: HOSPITAL | Age: 57
End: 2022-08-03

## 2022-08-03 ENCOUNTER — APPOINTMENT (OUTPATIENT)
Dept: BONE DENSITY | Facility: HOSPITAL | Age: 57
End: 2022-08-03

## 2022-08-16 DIAGNOSIS — R73.9 HYPERGLYCEMIA: ICD-10-CM

## 2022-08-16 DIAGNOSIS — L65.9 HAIR LOSS: ICD-10-CM

## 2022-08-16 DIAGNOSIS — E78.00 HYPERCHOLESTEROLEMIA: Primary | ICD-10-CM

## 2022-08-17 LAB
ALBUMIN SERPL-MCNC: 4.7 G/DL (ref 3.8–4.9)
ALBUMIN/GLOB SERPL: 2 {RATIO} (ref 1.2–2.2)
ALP SERPL-CCNC: 100 IU/L (ref 44–121)
ALT SERPL-CCNC: 17 IU/L (ref 0–32)
AST SERPL-CCNC: 18 IU/L (ref 0–40)
BASOPHILS # BLD AUTO: 0.1 X10E3/UL (ref 0–0.2)
BASOPHILS NFR BLD AUTO: 1 %
BILIRUB SERPL-MCNC: 0.3 MG/DL (ref 0–1.2)
BUN SERPL-MCNC: 12 MG/DL (ref 6–24)
BUN/CREAT SERPL: 14 (ref 9–23)
CALCIUM SERPL-MCNC: 9.7 MG/DL (ref 8.7–10.2)
CHLORIDE SERPL-SCNC: 101 MMOL/L (ref 96–106)
CHOLEST SERPL-MCNC: 290 MG/DL (ref 100–199)
CO2 SERPL-SCNC: 22 MMOL/L (ref 20–29)
CREAT SERPL-MCNC: 0.87 MG/DL (ref 0.57–1)
EGFRCR-CYS SERPLBLD CKD-EPI 2021: 77 ML/MIN/1.73
EOSINOPHIL # BLD AUTO: 0.1 X10E3/UL (ref 0–0.4)
EOSINOPHIL NFR BLD AUTO: 1 %
ERYTHROCYTE [DISTWIDTH] IN BLOOD BY AUTOMATED COUNT: 12.6 % (ref 11.7–15.4)
GLOBULIN SER CALC-MCNC: 2.3 G/DL (ref 1.5–4.5)
GLUCOSE SERPL-MCNC: 114 MG/DL (ref 65–99)
HBA1C MFR BLD: 6.2 % (ref 4.8–5.6)
HCT VFR BLD AUTO: 43.8 % (ref 34–46.6)
HDLC SERPL-MCNC: 37 MG/DL
HGB BLD-MCNC: 14.9 G/DL (ref 11.1–15.9)
IMM GRANULOCYTES # BLD AUTO: 0 X10E3/UL (ref 0–0.1)
IMM GRANULOCYTES NFR BLD AUTO: 0 %
IRON SATN MFR SERPL: 28 % (ref 15–55)
IRON SERPL-MCNC: 91 UG/DL (ref 27–159)
LDLC SERPL CALC-MCNC: 233 MG/DL (ref 0–99)
LDLC/HDLC SERPL: 6.3 RATIO (ref 0–3.2)
LYMPHOCYTES # BLD AUTO: 2.2 X10E3/UL (ref 0.7–3.1)
LYMPHOCYTES NFR BLD AUTO: 29 %
MCH RBC QN AUTO: 30.8 PG (ref 26.6–33)
MCHC RBC AUTO-ENTMCNC: 34 G/DL (ref 31.5–35.7)
MCV RBC AUTO: 91 FL (ref 79–97)
MONOCYTES # BLD AUTO: 0.6 X10E3/UL (ref 0.1–0.9)
MONOCYTES NFR BLD AUTO: 7 %
NEUTROPHILS # BLD AUTO: 4.6 X10E3/UL (ref 1.4–7)
NEUTROPHILS NFR BLD AUTO: 62 %
PLATELET # BLD AUTO: 330 X10E3/UL (ref 150–450)
POTASSIUM SERPL-SCNC: 4.9 MMOL/L (ref 3.5–5.2)
PROT SERPL-MCNC: 7 G/DL (ref 6–8.5)
RBC # BLD AUTO: 4.83 X10E6/UL (ref 3.77–5.28)
SODIUM SERPL-SCNC: 140 MMOL/L (ref 134–144)
T4 FREE SERPL-MCNC: 1.32 NG/DL (ref 0.82–1.77)
TIBC SERPL-MCNC: 328 UG/DL (ref 250–450)
TRIGL SERPL-MCNC: 110 MG/DL (ref 0–149)
TSH SERPL DL<=0.005 MIU/L-ACNC: 1.72 UIU/ML (ref 0.45–4.5)
UIBC SERPL-MCNC: 237 UG/DL (ref 131–425)
VLDLC SERPL CALC-MCNC: 20 MG/DL (ref 5–40)
WBC # BLD AUTO: 7.6 X10E3/UL (ref 3.4–10.8)

## 2022-08-22 ENCOUNTER — OFFICE VISIT (OUTPATIENT)
Dept: FAMILY MEDICINE CLINIC | Facility: CLINIC | Age: 57
End: 2022-08-22

## 2022-08-22 VITALS
WEIGHT: 130 LBS | HEIGHT: 63 IN | TEMPERATURE: 97.8 F | OXYGEN SATURATION: 97 % | DIASTOLIC BLOOD PRESSURE: 70 MMHG | SYSTOLIC BLOOD PRESSURE: 126 MMHG | HEART RATE: 88 BPM | BODY MASS INDEX: 23.04 KG/M2 | RESPIRATION RATE: 18 BRPM

## 2022-08-22 DIAGNOSIS — E78.00 HYPERCHOLESTEROLEMIA: Primary | ICD-10-CM

## 2022-08-22 PROCEDURE — 99213 OFFICE O/P EST LOW 20 MIN: CPT | Performed by: INTERNAL MEDICINE

## 2022-08-22 NOTE — PROGRESS NOTES
Subjective   Lissa Tavarez is a 57 y.o. female. Patient is here today for   Chief Complaint   Patient presents with   • Follow-up     6 mo lab follow up           Vitals:    22 1417   BP: 126/70   Pulse: 88   Resp: 18   Temp: 97.8 °F (36.6 °C)   SpO2: 97%     Body mass index is 23.03 kg/m².      Past Medical History:   Diagnosis Date   • Anxiety    • Cerebral arterial aneurysm    • Depression    • Epilepsy (HCC)     LAST SEIZURE    • Gestational diabetes    • H/O breast biopsy    • Hyperglycemia    • Hyperlipidemia    • Migraines    • PONV (postoperative nausea and vomiting)    • Spinal headache     FROM EPIDURAL HAD BLOOD PATCH X 2      Allergies   Allergen Reactions   • Codeine Phosphate [Codeine] Hives     SWELLING OF THROAT   • Sulfa Antibiotics Hives     ITCHING      Social History     Socioeconomic History   • Marital status:    Tobacco Use   • Smoking status: Former Smoker     Packs/day: 0.50     Types: Cigarettes     Quit date:      Years since quittin.6   • Smokeless tobacco: Never Used   Substance and Sexual Activity   • Alcohol use: No   • Drug use: No   • Sexual activity: Defer        Current Outpatient Medications:   •  atorvastatin (LIPITOR) 40 MG tablet, Take 1 tablet by mouth once daily, Disp: 90 tablet, Rfl: 0  •  Coenzyme Q10 (COQ10) 200 MG capsule, Take 200 mg by mouth 2 (Two) Times a Day., Disp: , Rfl:   •  topiramate (TOPAMAX) 50 MG tablet, Take 2 tablets by mouth 2 (Two) Times a Day., Disp: 360 tablet, Rfl: 1  •  cholecalciferol (VITAMIN D3) 25 MCG (1000 UT) tablet, Take 1,000 Units by mouth Daily., Disp: , Rfl:   •  magnesium oxide (MAGOX) 400 (241.3 Mg) MG tablet tablet, Take 400 mg by mouth Daily., Disp: , Rfl:      Objective     She is here to follow-up on labs from last week.    She has recently had a work-up regarding some hematuria which showed a bladder tumor.  She is following up with Dr. Wolff of urology.  She is going to surgery on  to  address this issue.         Review of Systems   Constitutional: Negative.    HENT: Negative.    Respiratory: Negative.    Cardiovascular: Negative.    Musculoskeletal: Negative.    Psychiatric/Behavioral: Negative.        Physical Exam  Vitals and nursing note reviewed.   Constitutional:       Appearance: Normal appearance.      Comments: Pleasant, neatly groomed, in no distress.   Neck:      Vascular: No carotid bruit.   Cardiovascular:      Rate and Rhythm: Regular rhythm.      Heart sounds: Normal heart sounds. No murmur heard.    No gallop.   Pulmonary:      Effort: No respiratory distress.      Breath sounds: Normal breath sounds. No wheezing or rales.   Neurological:      Mental Status: She is alert and oriented to person, place, and time.   Psychiatric:         Mood and Affect: Mood normal.         Behavior: Behavior normal.         Thought Content: Thought content normal.           Problems Addressed this Visit        Cardiac and Vasculature    Hypercholesterolemia - Primary      Diagnoses       Codes Comments    Hypercholesterolemia    -  Primary ICD-10-CM: E78.00  ICD-9-CM: 272.0             PLAN  Cholesterol is too high.  I given her prescription for atorvastatin 40 mg once daily but it makes her anxious to take such a big dose of atorvastatin so I asked her to cut the pill in half and to take one half of the 40 mg tablet for a total of 20 mg of atorvastatin once daily.  I like to have her back in about 3 months.  Fasting labs prior that visit should include a lipid profile and a comprehensive metabolic panel.  No follow-ups on file.

## 2022-10-07 RX ORDER — TOPIRAMATE 50 MG/1
TABLET, FILM COATED ORAL
Qty: 360 TABLET | Refills: 0 | Status: SHIPPED | OUTPATIENT
Start: 2022-10-07 | End: 2023-02-24 | Stop reason: SDUPTHER

## 2022-10-07 RX ORDER — ATORVASTATIN CALCIUM 40 MG/1
TABLET, FILM COATED ORAL
Qty: 90 TABLET | Refills: 0 | Status: SHIPPED | OUTPATIENT
Start: 2022-10-07 | End: 2023-02-24 | Stop reason: SDUPTHER

## 2022-11-17 DIAGNOSIS — E78.00 HYPERCHOLESTEROLEMIA: Primary | ICD-10-CM

## 2023-01-03 ENCOUNTER — TELEPHONE (OUTPATIENT)
Dept: FAMILY MEDICINE CLINIC | Facility: CLINIC | Age: 58
End: 2023-01-03
Payer: MEDICARE

## 2023-01-03 NOTE — TELEPHONE ENCOUNTER
MARITA ATTEMPTED THE WARM TRANSFER PROCESS, BUT WAS UNSUCCESSFUL.     Caller: Lissa Tavarez    Relationship: Self    Best call back number: [phone]    What orders are you requesting (i.e. lab or imaging): ROUTINE LABS    In what timeframe would the patient need to come in: BEFORE HER 01/19/2023 APPOINTMENT     Where will you receive your lab/imaging services: IN-OFFICE

## 2023-01-06 NOTE — TELEPHONE ENCOUNTER
Caller: Lissa Tavarez    Relationship to patient: Self    Best call back number: [phone]    Patient is needing: HUB ATTEMPTED TO WARM TRANSFER TO SCHEDULE LABS, UNABLE TO DO SO    REQUESTED CALLBACK

## 2023-01-10 DIAGNOSIS — E78.00 HYPERCHOLESTEROLEMIA: Primary | ICD-10-CM

## 2023-01-13 LAB
ALBUMIN SERPL-MCNC: 4.6 G/DL (ref 3.5–5.2)
ALBUMIN/GLOB SERPL: 1.8 G/DL
ALP SERPL-CCNC: 105 U/L (ref 39–117)
ALT SERPL-CCNC: 22 U/L (ref 1–33)
AST SERPL-CCNC: 18 U/L (ref 1–32)
BILIRUB SERPL-MCNC: 0.6 MG/DL (ref 0–1.2)
BUN SERPL-MCNC: 15 MG/DL (ref 6–20)
BUN/CREAT SERPL: 15.6 (ref 7–25)
CALCIUM SERPL-MCNC: 10 MG/DL (ref 8.6–10.5)
CHLORIDE SERPL-SCNC: 105 MMOL/L (ref 98–107)
CHOLEST SERPL-MCNC: 201 MG/DL (ref 0–200)
CHOLEST/HDLC SERPL: 3.94 {RATIO}
CO2 SERPL-SCNC: 28.7 MMOL/L (ref 22–29)
CREAT SERPL-MCNC: 0.96 MG/DL (ref 0.57–1)
EGFRCR SERPLBLD CKD-EPI 2021: 68.7 ML/MIN/1.73
GLOBULIN SER CALC-MCNC: 2.6 GM/DL
GLUCOSE SERPL-MCNC: 113 MG/DL (ref 65–99)
HDLC SERPL-MCNC: 51 MG/DL (ref 40–60)
LDLC SERPL CALC-MCNC: 135 MG/DL (ref 0–100)
POTASSIUM SERPL-SCNC: 4.8 MMOL/L (ref 3.5–5.2)
PROT SERPL-MCNC: 7.2 G/DL (ref 6–8.5)
SODIUM SERPL-SCNC: 141 MMOL/L (ref 136–145)
TRIGL SERPL-MCNC: 85 MG/DL (ref 0–150)
VLDLC SERPL CALC-MCNC: 15 MG/DL (ref 5–40)

## 2023-01-19 ENCOUNTER — OFFICE VISIT (OUTPATIENT)
Dept: FAMILY MEDICINE CLINIC | Facility: CLINIC | Age: 58
End: 2023-01-19
Payer: MEDICARE

## 2023-01-19 VITALS
HEIGHT: 63 IN | BODY MASS INDEX: 23.96 KG/M2 | OXYGEN SATURATION: 97 % | HEART RATE: 74 BPM | DIASTOLIC BLOOD PRESSURE: 70 MMHG | SYSTOLIC BLOOD PRESSURE: 124 MMHG | TEMPERATURE: 99.1 F | RESPIRATION RATE: 16 BRPM | WEIGHT: 135.2 LBS

## 2023-01-19 DIAGNOSIS — E78.00 HYPERCHOLESTEROLEMIA: Primary | ICD-10-CM

## 2023-01-19 PROCEDURE — 99213 OFFICE O/P EST LOW 20 MIN: CPT | Performed by: INTERNAL MEDICINE

## 2023-01-19 RX ORDER — AMOXICILLIN 500 MG/1
CAPSULE ORAL
COMMUNITY
Start: 2023-01-10

## 2023-01-19 NOTE — PROGRESS NOTES
Subjective   Lissa Tavarez is a 57 y.o. female. Patient is here today for   Chief Complaint   Patient presents with   • Results     Hypercholesteremia 3 mo f/u           Vitals:    01/19/23 1409   BP: 124/70   Pulse: 74   Resp: 16   Temp: 99.1 °F (37.3 °C)   SpO2: 97%     Body mass index is 23.96 kg/m².      Past Medical History:   Diagnosis Date   • Anxiety    • Cancer (HCC)    • Cerebral arterial aneurysm    • Depression    • Epilepsy (HCC) 2012    LAST SEIZURE 2017   • Gestational diabetes    • H/O breast biopsy    • Hyperglycemia    • Hyperlipidemia    • Migraines    • PONV (postoperative nausea and vomiting)    • Spinal headache 1986    FROM EPIDURAL HAD BLOOD PATCH X 2      Allergies   Allergen Reactions   • Codeine Phosphate [Codeine] Hives     SWELLING OF THROAT   • Sulfa Antibiotics Hives     ITCHING      Social History     Socioeconomic History   • Marital status:    Tobacco Use   • Smoking status: Former     Packs/day: 0.50     Types: Cigarettes     Quit date: 2013     Years since quitting: 10.0   • Smokeless tobacco: Never   Substance and Sexual Activity   • Alcohol use: No   • Drug use: No   • Sexual activity: Defer        Current Outpatient Medications:   •  amoxicillin (AMOXIL) 500 MG capsule, TAKE 2 CAPSULES BY MOUTH NOW AND THEN 1 THREE TIMES DAILY UNTIL GONE, Disp: , Rfl:   •  atorvastatin (LIPITOR) 40 MG tablet, Take 1 tablet by mouth once daily, Disp: 90 tablet, Rfl: 0  •  cholecalciferol (VITAMIN D3) 25 MCG (1000 UT) tablet, Take 1,000 Units by mouth Daily., Disp: , Rfl:   •  Coenzyme Q10 (COQ10) 200 MG capsule, Take 200 mg by mouth 2 (Two) Times a Day., Disp: , Rfl:   •  magnesium oxide (MAGOX) 400 (241.3 Mg) MG tablet tablet, Take 400 mg by mouth Daily., Disp: , Rfl:   •  topiramate (TOPAMAX) 50 MG tablet, Take 2 tablets by mouth twice daily, Disp: 360 tablet, Rfl: 0     Objective     History of Present Illness  She is here to follow-up on hypercholesterolemia.    She tells me that she  thinks she could be doing better on her diet.    She feels pretty well.  Emotionally she is doing well.       Review of Systems   Constitutional: Negative.    HENT: Negative.    Respiratory: Negative.    Cardiovascular: Negative.    Musculoskeletal: Negative.    Psychiatric/Behavioral: Negative.        Physical Exam  Vitals and nursing note reviewed.   Constitutional:       Appearance: Normal appearance.      Comments: Pleasant, neatly groomed, no distress.   Cardiovascular:      Rate and Rhythm: Regular rhythm.      Heart sounds: Normal heart sounds. No murmur heard.    No gallop.   Pulmonary:      Effort: No respiratory distress.      Breath sounds: Normal breath sounds. No wheezing or rales.   Neurological:      Mental Status: She is alert and oriented to person, place, and time.   Psychiatric:         Mood and Affect: Mood normal.         Behavior: Behavior normal.         Thought Content: Thought content normal.           Problems Addressed this Visit    None  Diagnoses    None.           PLAN  She has responded well to atorvastatin 40 mg daily.  Her LDL cholesterol is 135 would like to see it lower still she thinks that she can make some dietary adjustments to make it lower.  I like to have her back in 3 to 4 months for an annual physical exam.  Fasting labs prior that visit should include lipid profile, comprehensive metabolic panel, CBC, urinalysis.  Vitamin D level will be good to screen her for vitamin D deficiency.    If she does not have an LDL cholesterol of less than 100 and a maximum, I would increase her atorvastatin to 80 mg at that time.  No follow-ups on file.

## 2023-02-24 RX ORDER — ATORVASTATIN CALCIUM 40 MG/1
40 TABLET, FILM COATED ORAL DAILY
Qty: 90 TABLET | Refills: 0 | Status: SHIPPED | OUTPATIENT
Start: 2023-02-24

## 2023-02-24 RX ORDER — TOPIRAMATE 50 MG/1
100 TABLET, FILM COATED ORAL 2 TIMES DAILY
Qty: 360 TABLET | Refills: 0 | Status: SHIPPED | OUTPATIENT
Start: 2023-02-24

## 2023-02-24 NOTE — TELEPHONE ENCOUNTER
Caller: Tavarez Lissa R    Relationship: Self    Best call back number: [phone]    Requested Prescriptions:   Requested Prescriptions     Pending Prescriptions Disp Refills   • topiramate (TOPAMAX) 50 MG tablet 360 tablet 0     Sig: Take 2 tablets by mouth 2 (Two) Times a Day.   • atorvastatin (LIPITOR) 40 MG tablet 90 tablet 0     Sig: Take 1 tablet by mouth Daily.        Pharmacy where request should be sent: [address] - [phone] [facility] [phone] [facility]     Additional details provided by patient: WILL NEED A NEW PRESCRIPTION.    Does the patient have less than a 3 day supply:  [x] Yes  [] No    Would you like a call back once the refill request has been completed: [] Yes [] No    If the office needs to give you a call back, can they leave a voicemail: [] Yes [] No    Betty Francois Rep   02/24/23 10:06 EST

## 2023-06-08 ENCOUNTER — OFFICE VISIT (OUTPATIENT)
Dept: FAMILY MEDICINE CLINIC | Facility: CLINIC | Age: 58
End: 2023-06-08
Payer: MEDICARE

## 2023-06-08 VITALS
OXYGEN SATURATION: 98 % | SYSTOLIC BLOOD PRESSURE: 122 MMHG | HEIGHT: 62 IN | TEMPERATURE: 97.7 F | HEART RATE: 83 BPM | BODY MASS INDEX: 26.13 KG/M2 | WEIGHT: 142 LBS | DIASTOLIC BLOOD PRESSURE: 76 MMHG

## 2023-06-08 DIAGNOSIS — Z12.2 ENCOUNTER FOR SCREENING FOR LUNG CANCER: ICD-10-CM

## 2023-06-08 DIAGNOSIS — M79.622 AXILLARY PAIN, LEFT: Primary | ICD-10-CM

## 2023-06-08 PROCEDURE — 99213 OFFICE O/P EST LOW 20 MIN: CPT | Performed by: INTERNAL MEDICINE

## 2023-06-09 PROBLEM — F19.982 DRUG INDUCED INSOMNIA: Status: RESOLVED | Noted: 2017-05-16 | Resolved: 2023-06-09

## 2023-06-09 PROBLEM — Z12.2 ENCOUNTER FOR SCREENING FOR LUNG CANCER: Status: ACTIVE | Noted: 2023-06-09

## 2023-06-09 PROBLEM — F45.9 SOMATOFORM DISORDER: Status: RESOLVED | Noted: 2017-05-16 | Resolved: 2023-06-09

## 2023-06-09 PROBLEM — M79.622 AXILLARY PAIN, LEFT: Status: ACTIVE | Noted: 2023-06-09

## 2023-06-09 NOTE — PROGRESS NOTES
Subjective   Lissa Tavarez is a 58 y.o. female. Patient is here today for   Chief Complaint   Patient presents with    armpit swelling     And painful, under both,4 wk          Vitals:    06/08/23 1034   BP: 122/76   Pulse: 83   Temp: 97.7 °F (36.5 °C)   SpO2: 98%     Body mass index is 25.97 kg/m².      Past Medical History:   Diagnosis Date    Anxiety     Cancer     Cerebral arterial aneurysm     Depression     Epilepsy 2012    LAST SEIZURE 2017    Gestational diabetes     H/O breast biopsy     Hyperglycemia     Hyperlipidemia     Migraines     PONV (postoperative nausea and vomiting)     Spinal headache 1986    FROM EPIDURAL HAD BLOOD PATCH X 2      Allergies   Allergen Reactions    Codeine Phosphate [Codeine] Hives     SWELLING OF THROAT    Sulfa Antibiotics Hives     ITCHING      Social History     Socioeconomic History    Marital status:    Tobacco Use    Smoking status: Former     Packs/day: 0.50     Types: Cigarettes     Quit date: 2013     Years since quitting: 10.4    Smokeless tobacco: Never   Substance and Sexual Activity    Alcohol use: No    Drug use: No    Sexual activity: Defer        Current Outpatient Medications:     atorvastatin (LIPITOR) 40 MG tablet, Take 1 tablet by mouth Daily., Disp: 90 tablet, Rfl: 0    Coenzyme Q10 (COQ10) 200 MG capsule, Take 1 capsule by mouth 2 (Two) Times a Day., Disp: , Rfl:     magnesium oxide (MAGOX) 400 (241.3 Mg) MG tablet tablet, Take 1 tablet by mouth Daily., Disp: , Rfl:     topiramate (TOPAMAX) 50 MG tablet, Take 2 tablets by mouth 2 (Two) Times a Day., Disp: 360 tablet, Rfl: 0    cholecalciferol (VITAMIN D3) 25 MCG (1000 UT) tablet, Take 1,000 Units by mouth Daily. (Patient not taking: Reported on 6/8/2023), Disp: , Rfl:      Objective     History of Present Illness  She has had some pain in her axillary areas bilaterally for the last few months.  She thinks she is noticed some swelling there.     Review of Systems   Musculoskeletal:         She has  some swelling in her axillary area with some pain.  She has been aware of this for couple months.     Physical Exam  Constitutional:       Appearance: Normal appearance.   Cardiovascular:      Rate and Rhythm: Regular rhythm.      Heart sounds: Normal heart sounds. No murmur heard.    No gallop.   Pulmonary:      Effort: No respiratory distress.      Breath sounds: Normal breath sounds. No wheezing or rales.   Musculoskeletal:      Comments: Palpation of her axillary areas reveals no masses, induration, erythema.   Neurological:      Mental Status: She is alert and oriented to person, place, and time.   Psychiatric:         Mood and Affect: Mood normal.         Behavior: Behavior normal.         Thought Content: Thought content normal.         Problems Addressed this Visit          Musculoskeletal and Injuries    Axillary pain, left - Primary       Pulmonary and Pneumonias    Encounter for screening for lung cancer     Diagnoses         Codes Comments    Axillary pain, left    -  Primary ICD-10-CM: M79.622  ICD-9-CM: 729.5     Encounter for screening for lung cancer     ICD-10-CM: Z12.2  ICD-9-CM: V76.0               PLAN  She is a former smoker.  She has bilateral axillary pain.  She is got an unremarkable physical exam today there is not any axillary adenopathy.  We will get a CT scan of her chest without contrast.  She used to smoke cigarettes but she quit right about 10 years ago.  She needs to be screened for lung cancer anyway.  No follow-ups on file.

## 2023-07-18 ENCOUNTER — TELEPHONE (OUTPATIENT)
Dept: FAMILY MEDICINE CLINIC | Facility: CLINIC | Age: 58
End: 2023-07-18

## 2023-07-18 NOTE — TELEPHONE ENCOUNTER
Hub staff attempted to follow warm transfer process and was unsuccessful     Caller: Lissa Tavarez    Relationship to patient: Self    Best call back number: [phone]     Patient is needing: PATIENT NEEDS TO RESCHEDULE HER 7/20/23 LAB APPOINTMENT.  SHE SAYS IT IS OKAY TO LEAVE A VOICE MESSAGE.      PLEASE ADVISE.

## 2023-07-18 NOTE — TELEPHONE ENCOUNTER
I called her and left her a voice mail, asking her to call the office to reschedule her lab appointment as she requested

## 2023-07-31 ENCOUNTER — OFFICE VISIT (OUTPATIENT)
Dept: FAMILY MEDICINE CLINIC | Facility: CLINIC | Age: 58
End: 2023-07-31
Payer: MEDICARE

## 2023-07-31 VITALS
DIASTOLIC BLOOD PRESSURE: 72 MMHG | TEMPERATURE: 97.2 F | OXYGEN SATURATION: 99 % | WEIGHT: 140 LBS | HEIGHT: 62 IN | HEART RATE: 74 BPM | RESPIRATION RATE: 16 BRPM | BODY MASS INDEX: 25.76 KG/M2 | SYSTOLIC BLOOD PRESSURE: 120 MMHG

## 2023-07-31 DIAGNOSIS — F41.8 DEPRESSION WITH ANXIETY: Primary | ICD-10-CM

## 2023-07-31 PROCEDURE — 99213 OFFICE O/P EST LOW 20 MIN: CPT | Performed by: INTERNAL MEDICINE

## 2023-07-31 RX ORDER — HYDROXYZINE HYDROCHLORIDE 25 MG/1
25 TABLET, FILM COATED ORAL 3 TIMES DAILY PRN
Qty: 40 TABLET | Refills: 1 | Status: SHIPPED | OUTPATIENT
Start: 2023-07-31

## 2023-08-04 ENCOUNTER — HOSPITAL ENCOUNTER (OUTPATIENT)
Dept: CT IMAGING | Facility: HOSPITAL | Age: 58
Discharge: HOME | End: 2023-08-04
Admitting: INTERNAL MEDICINE
Payer: MEDICARE

## 2023-08-04 DIAGNOSIS — M79.622 AXILLARY PAIN, LEFT: ICD-10-CM

## 2023-08-04 PROBLEM — F41.8 DEPRESSION WITH ANXIETY: Status: ACTIVE | Noted: 2020-10-08

## 2023-08-04 PROBLEM — F41.8 DEPRESSION WITH ANXIETY: Status: RESOLVED | Noted: 2020-10-08 | Resolved: 2023-08-04

## 2023-08-04 PROCEDURE — 71250 CT THORAX DX C-: CPT

## 2023-08-10 ENCOUNTER — TELEPHONE (OUTPATIENT)
Dept: FAMILY MEDICINE CLINIC | Facility: CLINIC | Age: 58
End: 2023-08-10
Payer: MEDICARE

## 2023-08-10 NOTE — TELEPHONE ENCOUNTER
HUB READ TO PATIENT:    DIMITRYM FOR PT TO RETURN CALL TO BE INFORMED        OK FOR HUB TO INFORM OF BELOW MESSAGE FROM PCP, THANKS     **Please let her know that her CT scan of her chest showed no problem in her axilla/armpit.    The radiologist asked me to arrange for her to get a follow-up CT scan of her chest in a year. *                
LVM FOR PT TO RETURN CALL TO BE INFORMED      OK FOR HUB TO INFORM OF BELOW MESSAGE FROM PCP, THANKS    **Please let her know that her CT scan of her chest showed no problem in her axilla/armpit.    The radiologist asked me to arrange for her to get a follow-up CT scan of her chest in a year. *           
29-year-old male past medical history of asthma presenting with chest pain. Patient woke up yesterday having chest tightness that radiated to the back with associated shortness of breath and vomiting. Symptoms largely resolved over the next hour however the patient had continued chest tightness throughout the day. Patient had a second episode this morning upon waking up as well as pain in his left neck. Patient went to urgent care and was urged to come to the emergency department. Patient states he took his albuterol yesterday morning with some relief of his symptoms. Patient states he recently began testosterone shots for low testosterone. Patient denies recent travel, fevers, abdominal pain, smoking.

## 2023-08-30 ENCOUNTER — OFFICE VISIT (OUTPATIENT)
Dept: FAMILY MEDICINE CLINIC | Facility: CLINIC | Age: 58
End: 2023-08-30
Payer: MEDICARE

## 2023-08-30 VITALS
TEMPERATURE: 98.3 F | HEART RATE: 77 BPM | OXYGEN SATURATION: 98 % | DIASTOLIC BLOOD PRESSURE: 56 MMHG | SYSTOLIC BLOOD PRESSURE: 104 MMHG | HEIGHT: 62 IN | BODY MASS INDEX: 25.1 KG/M2 | WEIGHT: 136.4 LBS | RESPIRATION RATE: 12 BRPM

## 2023-08-30 DIAGNOSIS — F41.8 DEPRESSION WITH ANXIETY: Primary | ICD-10-CM

## 2023-08-30 PROCEDURE — 99213 OFFICE O/P EST LOW 20 MIN: CPT | Performed by: INTERNAL MEDICINE

## 2023-08-30 NOTE — PROGRESS NOTES
Subjective   Lissa Tavarez is a 58 y.o. female. Patient is here today for   Chief Complaint   Patient presents with    Hyperlipidemia    Follow-up     F/U CXR, AND MEDS           Vitals:    08/30/23 1030   BP: 104/56   Pulse: 77   Resp: 12   Temp: 98.3 øF (36.8 øC)   SpO2: 98%     Body mass index is 24.94 kg/mý.      Past Medical History:   Diagnosis Date    Anxiety     Cancer     Cerebral arterial aneurysm     Depression     Epilepsy 2012    LAST SEIZURE 2017    Gestational diabetes     H/O breast biopsy     Hyperglycemia     Hyperlipidemia     Migraines     PONV (postoperative nausea and vomiting)     Spinal headache 1986    FROM EPIDURAL HAD BLOOD PATCH X 2      Allergies   Allergen Reactions    Codeine Phosphate [Codeine] Hives     SWELLING OF THROAT    Sulfa Antibiotics Hives     ITCHING      Social History     Socioeconomic History    Marital status:    Tobacco Use    Smoking status: Former     Packs/day: 0.50     Years: 28.00     Pack years: 14.00     Types: Cigarettes     Start date: 1985     Quit date: 2013     Years since quitting: 10.6     Passive exposure: Past    Smokeless tobacco: Never   Substance and Sexual Activity    Alcohol use: No    Drug use: No    Sexual activity: Defer        Current Outpatient Medications:     atorvastatin (LIPITOR) 40 MG tablet, Take 1 tablet by mouth Daily., Disp: 90 tablet, Rfl: 0    cholecalciferol (VITAMIN D3) 25 MCG (1000 UT) tablet, Take 1 tablet by mouth Daily., Disp: , Rfl:     Coenzyme Q10 (COQ10) 200 MG capsule, Take 1 capsule by mouth 2 (Two) Times a Day., Disp: , Rfl:     hydrOXYzine (ATARAX) 25 MG tablet, Take 1 tablet by mouth 3 (Three) Times a Day As Needed for Itching or Anxiety., Disp: 40 tablet, Rfl: 1    magnesium oxide (MAGOX) 400 (241.3 Mg) MG tablet tablet, Take 1 tablet by mouth Daily., Disp: , Rfl:     REPLENS VAGINAL MOISTURIZER VA, Insert 1 each into the vagina., Disp: , Rfl:     sertraline (Zoloft) 50 MG tablet, Take 1 tablet by mouth  Daily., Disp: 30 tablet, Rfl: 2    topiramate (TOPAMAX) 50 MG tablet, Take 2 tablets by mouth 2 (Two) Times a Day., Disp: 360 tablet, Rfl: 0     Objective     History of Present Illness  She is here today to follow-up on a CT scan that I got of her chest and sertraline 50 mg 1 pill twice daily that I started her on for anxiety and depression.    She feels well.    She did discover that she has a recurrence of her bladder cancer.  She is being followed by urology for treatment.      Hyperlipidemia       Review of Systems   Constitutional: Negative.    HENT: Negative.     Respiratory: Negative.     Cardiovascular: Negative.    Musculoskeletal: Negative.    Psychiatric/Behavioral: Negative.       Physical Exam  Vitals and nursing note reviewed.   Constitutional:       Appearance: Normal appearance.      Comments: Pleasant, neatly groomed, no distress.  BMI 24.9.   Cardiovascular:      Rate and Rhythm: Regular rhythm.      Heart sounds: Normal heart sounds. No murmur heard.    No gallop.   Pulmonary:      Effort: No respiratory distress.      Breath sounds: Normal breath sounds. No wheezing or rales.   Neurological:      Mental Status: She is alert and oriented to person, place, and time.   Psychiatric:         Mood and Affect: Mood normal.         Behavior: Behavior normal.         Problems Addressed this Visit          Mental Health    Depression with anxiety - Primary     Diagnoses         Codes Comments    Depression with anxiety    -  Primary ICD-10-CM: F41.8  ICD-9-CM: 300.4               PLAN  She feels her depression with anxiety is pretty well controlled with sertraline 50 mg twice daily.  I asked her to follow-up in 2 months to see how she is getting along.  If she continues to do well I will probably see her back every 4 to 6 months after that.  No follow-ups on file.

## 2023-11-02 ENCOUNTER — OFFICE VISIT (OUTPATIENT)
Dept: FAMILY MEDICINE CLINIC | Facility: CLINIC | Age: 58
End: 2023-11-02
Payer: MEDICARE

## 2023-11-02 VITALS
RESPIRATION RATE: 16 BRPM | HEIGHT: 62 IN | BODY MASS INDEX: 24.37 KG/M2 | DIASTOLIC BLOOD PRESSURE: 60 MMHG | WEIGHT: 132.4 LBS | SYSTOLIC BLOOD PRESSURE: 118 MMHG

## 2023-11-02 DIAGNOSIS — F41.8 DEPRESSION WITH ANXIETY: ICD-10-CM

## 2023-11-02 DIAGNOSIS — E78.00 HYPERCHOLESTEROLEMIA: Primary | ICD-10-CM

## 2023-11-02 DIAGNOSIS — R73.9 HYPERGLYCEMIA: ICD-10-CM

## 2023-11-02 DIAGNOSIS — Z00.00 MEDICARE ANNUAL WELLNESS VISIT, SUBSEQUENT: ICD-10-CM

## 2023-11-02 RX ORDER — SERTRALINE HYDROCHLORIDE 100 MG/1
100 TABLET, FILM COATED ORAL DAILY
Qty: 90 TABLET | Refills: 1 | Status: SHIPPED | OUTPATIENT
Start: 2023-11-02

## 2023-11-02 NOTE — PROGRESS NOTES
The ABCs of the Annual Wellness Visit  Subsequent Medicare Wellness Visit    Subjective    Lissa Tavarez is a 58 y.o. female who presents for a Subsequent Medicare Wellness Visit.    The following portions of the patient's history were reviewed and   updated as appropriate: allergies, current medications, past family history, past medical history, past social history, past surgical history, and problem list.    Compared to one year ago, the patient feels her physical   health is the same.    Compared to one year ago, the patient feels her mental   health is the same.    Recent Hospitalizations:  She was not admitted to the hospital during the last year.       Current Medical Providers:  Patient Care Team:  Aries Morejon MD as PCP - General  Aries Morejon MD as PCP - Family Medicine  Billy Pinedo MD as Consulting Physician (Neurology)    Outpatient Medications Prior to Visit   Medication Sig Dispense Refill    atorvastatin (LIPITOR) 40 MG tablet Take 1 tablet by mouth Daily. 90 tablet 0    cholecalciferol (VITAMIN D3) 25 MCG (1000 UT) tablet Take 1 tablet by mouth Daily.      Coenzyme Q10 (COQ10) 200 MG capsule Take 1 capsule by mouth 2 (Two) Times a Day.      hydrOXYzine (ATARAX) 25 MG tablet Take 1 tablet by mouth 3 (Three) Times a Day As Needed for Itching or Anxiety. 40 tablet 1    magnesium oxide (MAGOX) 400 (241.3 Mg) MG tablet tablet Take 1 tablet by mouth Daily.      REPLENS VAGINAL MOISTURIZER VA Insert 1 each into the vagina.      topiramate (TOPAMAX) 50 MG tablet Take 2 tablets by mouth 2 (Two) Times a Day. 360 tablet 0    sertraline (ZOLOFT) 50 MG tablet Take 1 tablet by mouth once daily 30 tablet 0     No facility-administered medications prior to visit.       No opioid medication identified on active medication list. I have reviewed chart for other potential  high risk medication/s and harmful drug interactions in the elderly.        Aspirin is not on active medication list.  Aspirin use  "is not indicated based on review of current medical condition/s. Risk of harm outweighs potential benefits.  .    Patient Active Problem List   Diagnosis    Difficulty hearing    Generalized anxiety disorder    Hypercholesterolemia    Seizure disorder    Unspecified visual disturbance    Abdominal pain, chronic, right upper quadrant    Acute bacterial rhinosinusitis    Epigastric pain    Migraine with aura and without status migrainosus, not intractable    Weight loss, unintentional    Cerebral aneurysm, nonruptured    History of craniotomy    Cerebral aneurysm    Acute bronchitis    Diarrhea    Depression with anxiety    Hearing loss    Hyperglycemia    Nasal discharge    Otitis media    Skin lesion    Tinea corporis    Hair loss    Medicare annual wellness visit, subsequent    Axillary pain, left    Encounter for screening for lung cancer     Advance Care Planning   Advance Care Planning     Advance Directive is not on file.  ACP discussion was held with the patient during this visit. Patient does not have an advance directive, declines further assistance.     Objective    Vitals:    11/02/23 1006   BP: 118/60   BP Location: Right arm   Patient Position: Sitting   Cuff Size: Adult   Resp: 16   Weight: 60.1 kg (132 lb 6.4 oz)   Height: 157.5 cm (62.01\")     Estimated body mass index is 24.21 kg/m² as calculated from the following:    Height as of this encounter: 157.5 cm (62.01\").    Weight as of this encounter: 60.1 kg (132 lb 6.4 oz).    BMI is within normal parameters. No other follow-up for BMI required.      Does the patient have evidence of cognitive impairment? No          HEALTH RISK ASSESSMENT    Smoking Status:  Social History     Tobacco Use   Smoking Status Former    Packs/day: 0.50    Years: 28.00    Additional pack years: 0.00    Total pack years: 14.00    Types: Cigarettes    Start date: 1985    Quit date: 2013    Years since quitting: 10.8    Passive exposure: Past   Smokeless Tobacco Never "     Alcohol Consumption:  Social History     Substance and Sexual Activity   Alcohol Use No     Fall Risk Screen:    TOROADI Fall Risk Assessment was completed, and patient is at LOW risk for falls.Assessment completed on:2023    Depression Screenin/2/2023    10:00 AM   PHQ-2/PHQ-9 Depression Screening   Little Interest or Pleasure in Doing Things 1-->several days   Feeling Down, Depressed or Hopeless 1-->several days   Trouble Falling or Staying Asleep, or Sleeping Too Much 0-->not at all   Feeling Tired or Having Little Energy 0-->not at all   Poor Appetite or Overeating 0-->not at all   Feeling Bad about Yourself - or that You are a Failure or Have Let Yourself or Your Family Down 0-->not at all   Trouble Concentrating on Things, Such as Reading the Newspaper or Watching Television 0-->not at all   Moving or Speaking So Slowly that Other People Could Have Noticed? Or the Opposite - Being So Fidgety 1-->several days   Thoughts that You Would be Better Off Dead or of Hurting Yourself in Some Way 0-->not at all   PHQ-9: Brief Depression Severity Measure Score 3   If You Checked Off Any Problems, How Difficult Have These Problems Made It For You to Do Your Work, Take Care of Things at Home, or Get Along with Other People? not difficult at all       Health Habits and Functional and Cognitive Screenin/2/2023    10:00 AM   Functional & Cognitive Status   Do you have difficulty preparing food and eating? No   Do you have difficulty bathing yourself, getting dressed or grooming yourself? No   Do you have difficulty using the toilet? No   Do you have difficulty moving around from place to place? No   Do you have trouble with steps or getting out of a bed or a chair? No   Current Diet Well Balanced Diet   Dental Exam Up to date   Eye Exam Up to date   Exercise (times per week) 4 times per week   Current Exercises Include Walking   Do you need help using the phone?  No   Are you deaf or do you have  serious difficulty hearing?  No   Do you need help to go to places out of walking distance? No   Do you need help shopping? No   Do you need help preparing meals?  No   Do you need help with housework?  No   Do you need help with laundry? No   Do you need help taking your medications? No   Do you need help managing money? No   Do you ever drive or ride in a car without wearing a seat belt? No   Have you felt unusual stress, anger or loneliness in the last month? No   Who do you live with? Alone   If you need help, do you have trouble finding someone available to you? No   Have you been bothered in the last four weeks by sexual problems? No   Do you have difficulty concentrating, remembering or making decisions? No       Age-appropriate Screening Schedule:  Refer to the list below for future screening recommendations based on patient's age, sex and/or medical conditions. Orders for these recommended tests are listed in the plan section. The patient has been provided with a written plan.    Health Maintenance   Topic Date Due    TDAP/TD VACCINES (1 - Tdap) Never done    ZOSTER VACCINE (1 of 2) Never done    PAP SMEAR  Never done    INFLUENZA VACCINE  08/01/2023    COVID-19 Vaccine (3 - 2023-24 season) 09/01/2023    LIPID PANEL  01/12/2024    ANNUAL WELLNESS VISIT  11/02/2024    MAMMOGRAM  10/10/2025    COLORECTAL CANCER SCREENING  02/13/2027    HEPATITIS C SCREENING  Completed    Pneumococcal Vaccine 0-64  Aged Out                  CMS Preventative Services Quick Reference  Risk Factors Identified During Encounter  None Identified  The above risks/problems have been discussed with the patient.  Pertinent information has been shared with the patient in the After Visit Summary.  An After Visit Summary and PPPS were made available to the patient.    Follow Up:   Next Medicare Wellness visit to be scheduled in 1 year.       Additional E&M Note during same encounter follows:  Patient has multiple medical problems which are  "significant and separately identifiable that require additional work above and beyond the Medicare Wellness Visit.      Chief Complaint  Medicare Wellness-subsequent    Subjective        she is here for a Medicare annual wellness visit.    She is being followed by urology for bladder cancer.  She has a low-grade papillary TCC.  Her prognosis is good.    She feels well.  She has no complaints.  Her father passed away recently.      Lissa Tavarez is also being seen today for review of labs, hypercholesterolemia, depression with anxiety.    Review of Systems   Constitutional: Negative.    HENT: Negative.     Respiratory: Negative.     Cardiovascular: Negative.    Musculoskeletal: Negative.    Psychiatric/Behavioral: Negative.         Objective   Vital Signs:  /60 (BP Location: Right arm, Patient Position: Sitting, Cuff Size: Adult)   Resp 16   Ht 157.5 cm (62.01\")   Wt 60.1 kg (132 lb 6.4 oz)   BMI 24.21 kg/m²     Physical Exam  Vitals and nursing note reviewed.   Constitutional:       General: She is not in acute distress.     Appearance: Normal appearance. She is normal weight. She is not ill-appearing, toxic-appearing or diaphoretic.      Comments: Pleasant, neatly groomed, no distress.   Neck:      Vascular: No carotid bruit.   Cardiovascular:      Rate and Rhythm: Regular rhythm.      Heart sounds: Normal heart sounds. No murmur heard.     No gallop.   Pulmonary:      Effort: No respiratory distress.      Breath sounds: Normal breath sounds. No wheezing or rales.   Neurological:      Mental Status: She is alert and oriented to person, place, and time.   Psychiatric:         Mood and Affect: Mood normal.         Behavior: Behavior normal.         Thought Content: Thought content normal.                         Assessment and Plan   Diagnoses and all orders for this visit:    1. Hypercholesterolemia (Primary)    2. Hyperglycemia    3. Medicare annual wellness visit, subsequent    4. Depression with " anxiety    Other orders  -     sertraline (ZOLOFT) 100 MG tablet; Take 1 tablet by mouth Daily.  Dispense: 90 tablet; Refill: 1      Overall she is doing well.  Continues follow-up with urology regarding treatment of her transitional cell carcinoma of her bladder.  Her prognosis is good.    She has hypercholesterolemia and I think she is having some trouble getting into the routine of taking that medication regularly.  I asked her to do her best.    She feels her depression with anxiety is well controlled.    She does not wish to receive a flu vaccine or COVID-vaccine.  I asked her to consider getting a Shingrix vaccine and a Tdap at the pharmacy.    I will have her back to recheck her lipid and comprehensive metabolic panel at the end of December.  Other labs prior to that visit should include: Hemoglobin A1c follow-up on hyperglycemia, hemoglobin A1c, CBC without differential and urinalysis.       Follow Up   No follow-ups on file.  Patient was given instructions and counseling regarding her condition or for health maintenance advice. Please see specific information pulled into the AVS if appropriate.

## 2023-11-28 DIAGNOSIS — R73.9 HYPERGLYCEMIA: Primary | ICD-10-CM

## 2023-11-28 DIAGNOSIS — E78.00 HYPERCHOLESTEROLEMIA: ICD-10-CM

## 2023-11-30 LAB
ALBUMIN SERPL-MCNC: 4.7 G/DL (ref 3.5–5.2)
ALBUMIN/GLOB SERPL: 2 G/DL
ALP SERPL-CCNC: 96 U/L (ref 39–117)
ALT SERPL-CCNC: 23 U/L (ref 1–33)
AST SERPL-CCNC: 24 U/L (ref 1–32)
BILIRUB SERPL-MCNC: 0.6 MG/DL (ref 0–1.2)
BUN SERPL-MCNC: 11 MG/DL (ref 6–20)
BUN/CREAT SERPL: 14.1 (ref 7–25)
CALCIUM SERPL-MCNC: 9.6 MG/DL (ref 8.6–10.5)
CHLORIDE SERPL-SCNC: 103 MMOL/L (ref 98–107)
CHOLEST SERPL-MCNC: 184 MG/DL (ref 0–200)
CO2 SERPL-SCNC: 26 MMOL/L (ref 22–29)
CREAT SERPL-MCNC: 0.78 MG/DL (ref 0.57–1)
EGFRCR SERPLBLD CKD-EPI 2021: 87.6 ML/MIN/1.73
ERYTHROCYTE [DISTWIDTH] IN BLOOD BY AUTOMATED COUNT: 12 % (ref 12.3–15.4)
GLOBULIN SER CALC-MCNC: 2.3 GM/DL
GLUCOSE SERPL-MCNC: 108 MG/DL (ref 65–99)
HBA1C MFR BLD: 5.9 % (ref 4.8–5.6)
HCT VFR BLD AUTO: 42 % (ref 34–46.6)
HDLC SERPL-MCNC: 51 MG/DL (ref 40–60)
HGB BLD-MCNC: 13.7 G/DL (ref 12–15.9)
LDLC SERPL CALC-MCNC: 120 MG/DL (ref 0–100)
LDLC/HDLC SERPL: 2.33 {RATIO}
MCH RBC QN AUTO: 29.1 PG (ref 26.6–33)
MCHC RBC AUTO-ENTMCNC: 32.6 G/DL (ref 31.5–35.7)
MCV RBC AUTO: 89.2 FL (ref 79–97)
PLATELET # BLD AUTO: 313 10*3/MM3 (ref 140–450)
POTASSIUM SERPL-SCNC: 4.2 MMOL/L (ref 3.5–5.2)
PROT SERPL-MCNC: 7 G/DL (ref 6–8.5)
RBC # BLD AUTO: 4.71 10*6/MM3 (ref 3.77–5.28)
SODIUM SERPL-SCNC: 140 MMOL/L (ref 136–145)
TRIGL SERPL-MCNC: 70 MG/DL (ref 0–150)
VLDLC SERPL CALC-MCNC: 13 MG/DL (ref 5–40)
WBC # BLD AUTO: 6.86 10*3/MM3 (ref 3.4–10.8)

## 2023-12-07 ENCOUNTER — OFFICE VISIT (OUTPATIENT)
Dept: FAMILY MEDICINE CLINIC | Facility: CLINIC | Age: 58
End: 2023-12-07
Payer: MEDICARE

## 2023-12-07 VITALS
OXYGEN SATURATION: 98 % | WEIGHT: 131.4 LBS | SYSTOLIC BLOOD PRESSURE: 100 MMHG | RESPIRATION RATE: 18 BRPM | BODY MASS INDEX: 24.18 KG/M2 | DIASTOLIC BLOOD PRESSURE: 68 MMHG | HEIGHT: 62 IN | TEMPERATURE: 98.3 F | HEART RATE: 65 BPM

## 2023-12-07 DIAGNOSIS — E78.00 HYPERCHOLESTEROLEMIA: Primary | ICD-10-CM

## 2023-12-07 RX ORDER — EZETIMIBE 10 MG/1
10 TABLET ORAL DAILY
Qty: 90 TABLET | Refills: 3 | Status: SHIPPED | OUTPATIENT
Start: 2023-12-07

## 2023-12-07 NOTE — PROGRESS NOTES
Subjective   Lissa Tavarez is a 58 y.o. female. Patient is here today for   Chief Complaint   Patient presents with    Hyperlipidemia    Follow-up          Vitals:    12/07/23 1043   BP: 100/68   Pulse: 65   Resp: 18   Temp: 98.3 °F (36.8 °C)   SpO2: 98%     Body mass index is 24.03 kg/m².      Past Medical History:   Diagnosis Date    Anxiety     Cancer     Cerebral arterial aneurysm     Depression     Epilepsy 2012    LAST SEIZURE 2017    Gestational diabetes     H/O breast biopsy     Hyperglycemia     Hyperlipidemia     Migraines     PONV (postoperative nausea and vomiting)     Spinal headache 1986    FROM EPIDURAL HAD BLOOD PATCH X 2      Allergies   Allergen Reactions    Codeine Phosphate [Codeine] Hives     SWELLING OF THROAT    Sulfa Antibiotics Hives     ITCHING      Social History     Socioeconomic History    Marital status:    Tobacco Use    Smoking status: Former     Packs/day: 0.50     Years: 28.00     Additional pack years: 0.00     Total pack years: 14.00     Types: Cigarettes     Start date: 1985     Quit date: 2013     Years since quitting: 10.9     Passive exposure: Past    Smokeless tobacco: Never   Substance and Sexual Activity    Alcohol use: No    Drug use: No    Sexual activity: Defer        Current Outpatient Medications:     atorvastatin (LIPITOR) 40 MG tablet, Take 1 tablet by mouth Daily., Disp: 90 tablet, Rfl: 0    cholecalciferol (VITAMIN D3) 25 MCG (1000 UT) tablet, Take 1 tablet by mouth Daily., Disp: , Rfl:     Coenzyme Q10 (COQ10) 200 MG capsule, Take 1 capsule by mouth 2 (Two) Times a Day., Disp: , Rfl:     hydrOXYzine (ATARAX) 25 MG tablet, Take 1 tablet by mouth 3 (Three) Times a Day As Needed for Itching or Anxiety., Disp: 40 tablet, Rfl: 1    magnesium oxide (MAGOX) 400 (241.3 Mg) MG tablet tablet, Take 1 tablet by mouth Daily., Disp: , Rfl:     REPLENS VAGINAL MOISTURIZER VA, Insert 1 each into the vagina., Disp: , Rfl:     sertraline (ZOLOFT) 100 MG tablet, Take 1 tablet  by mouth Daily., Disp: 90 tablet, Rfl: 1    topiramate (TOPAMAX) 50 MG tablet, Take 2 tablets by mouth 2 (Two) Times a Day., Disp: 360 tablet, Rfl: 0    ezetimibe (Zetia) 10 MG tablet, Take 1 tablet by mouth Daily., Disp: 90 tablet, Rfl: 3     Objective     History of Present Illness  This patient is here to follow-up on hypercholesterolemia.    She saw Dr. Mcqueen recently for cystoscopy on December 1.  This was follow-up for low-grade bladder tumor.  She completed BCG instillation x 6 prior to that visit the beginning of this month.  He asked her to follow-up in 4 months for reevaluation.  Hyperlipidemia         Review of Systems   Constitutional: Negative.    HENT: Negative.     Respiratory: Negative.     Cardiovascular: Negative.    Musculoskeletal: Negative.    Psychiatric/Behavioral: Negative.         Physical Exam  Vitals and nursing note reviewed.   Constitutional:       Appearance: Normal appearance.      Comments: Pleasant, neatly groomed, no distress.  BMI 24.   Cardiovascular:      Rate and Rhythm: Regular rhythm.      Heart sounds: Normal heart sounds. No murmur heard.     No gallop.   Pulmonary:      Effort: No respiratory distress.      Breath sounds: Normal breath sounds. No wheezing or rales.   Neurological:      Mental Status: She is alert and oriented to person, place, and time.   Psychiatric:         Mood and Affect: Mood normal.         Behavior: Behavior normal.         Thought Content: Thought content normal.           Problems Addressed this Visit          Cardiac and Vasculature    Hypercholesterolemia - Primary    Relevant Medications    ezetimibe (Zetia) 10 MG tablet     Diagnoses         Codes Comments    Hypercholesterolemia    -  Primary ICD-10-CM: E78.00  ICD-9-CM: 272.0               PLAN  Her hypercholesterolemia is fairly well-controlled on atorvastatin 40 mg daily though I would like to see better control still.  I asked her to start taking Zetia 10 mg daily.    Would like to  have her back in about 3 months to recheck a lipid and comprehensive metabolic panel about a week before that visit.  No follow-ups on file.

## 2024-03-06 DIAGNOSIS — E78.00 HYPERCHOLESTEROLEMIA: Primary | ICD-10-CM

## 2024-03-06 DIAGNOSIS — R73.9 HYPERGLYCEMIA: ICD-10-CM

## 2024-03-12 ENCOUNTER — TELEPHONE (OUTPATIENT)
Dept: NEUROSURGERY | Facility: CLINIC | Age: 59
End: 2024-03-12
Payer: MEDICARE

## 2024-03-12 DIAGNOSIS — I67.1 CEREBRAL ANEURYSM: Primary | ICD-10-CM

## 2024-03-12 LAB
ALBUMIN SERPL-MCNC: 4.4 G/DL (ref 3.5–5.2)
ALBUMIN/GLOB SERPL: 1.8 G/DL
ALP SERPL-CCNC: 104 U/L (ref 39–117)
ALT SERPL-CCNC: 15 U/L (ref 1–33)
AST SERPL-CCNC: 19 U/L (ref 1–32)
BILIRUB SERPL-MCNC: 0.5 MG/DL (ref 0–1.2)
BUN SERPL-MCNC: 15 MG/DL (ref 8–23)
BUN/CREAT SERPL: 17 (ref 7–25)
CALCIUM SERPL-MCNC: 9.2 MG/DL (ref 8.6–10.5)
CHLORIDE SERPL-SCNC: 104 MMOL/L (ref 98–107)
CHOLEST SERPL-MCNC: 192 MG/DL (ref 0–200)
CHOLEST/HDLC SERPL: 3.92 {RATIO}
CO2 SERPL-SCNC: 25.9 MMOL/L (ref 22–29)
CREAT SERPL-MCNC: 0.88 MG/DL (ref 0.57–1)
EGFRCR SERPLBLD CKD-EPI 2021: 75.3 ML/MIN/1.73
GLOBULIN SER CALC-MCNC: 2.5 GM/DL
GLUCOSE SERPL-MCNC: 123 MG/DL (ref 65–99)
HBA1C MFR BLD: 6.1 % (ref 4.8–5.6)
HDLC SERPL-MCNC: 49 MG/DL (ref 40–60)
LDLC SERPL CALC-MCNC: 131 MG/DL (ref 0–100)
POTASSIUM SERPL-SCNC: 4.3 MMOL/L (ref 3.5–5.2)
PROT SERPL-MCNC: 6.9 G/DL (ref 6–8.5)
SODIUM SERPL-SCNC: 141 MMOL/L (ref 136–145)
TRIGL SERPL-MCNC: 65 MG/DL (ref 0–150)
VLDLC SERPL CALC-MCNC: 12 MG/DL (ref 5–40)

## 2024-03-12 NOTE — TELEPHONE ENCOUNTER
Name: Lissa Tavarez    Relationship: Self    Best Callback Number: [phone]    HUB PROVIDED THE RELAY MESSAGE FROM THE OFFICE   PATIENT SCHEDULED AS REQUESTED    ADDITIONAL INFORMATION:

## 2024-03-15 ENCOUNTER — OFFICE VISIT (OUTPATIENT)
Dept: FAMILY MEDICINE CLINIC | Facility: CLINIC | Age: 59
End: 2024-03-15
Payer: MEDICARE

## 2024-03-15 VITALS
HEART RATE: 76 BPM | RESPIRATION RATE: 15 BRPM | OXYGEN SATURATION: 98 % | BODY MASS INDEX: 23.7 KG/M2 | TEMPERATURE: 97 F | HEIGHT: 62 IN | SYSTOLIC BLOOD PRESSURE: 124 MMHG | DIASTOLIC BLOOD PRESSURE: 80 MMHG | WEIGHT: 128.8 LBS

## 2024-03-15 DIAGNOSIS — R73.9 HYPERGLYCEMIA: ICD-10-CM

## 2024-03-15 DIAGNOSIS — E78.00 HYPERCHOLESTEROLEMIA: Primary | ICD-10-CM

## 2024-03-15 PROCEDURE — 99213 OFFICE O/P EST LOW 20 MIN: CPT | Performed by: INTERNAL MEDICINE

## 2024-03-15 RX ORDER — ATORVASTATIN CALCIUM 80 MG/1
80 TABLET, FILM COATED ORAL DAILY
Qty: 90 TABLET | Refills: 3 | Status: SHIPPED | OUTPATIENT
Start: 2024-03-15

## 2024-03-15 RX ORDER — CEFDINIR 300 MG/1
300 CAPSULE ORAL 2 TIMES DAILY
Qty: 20 CAPSULE | Refills: 0 | Status: SHIPPED | OUTPATIENT
Start: 2024-03-15

## 2024-03-15 NOTE — PROGRESS NOTES
Subjective   Lissa Tavarez is a 59 y.o. female. Patient is here today for   Chief Complaint   Patient presents with    Hyperlipidemia          Vitals:    03/15/24 1014   BP: 124/80   Pulse: 76   Resp: 15   Temp: 97 °F (36.1 °C)   SpO2: 98%     Body mass index is 23.55 kg/m².      Past Medical History:   Diagnosis Date    Anxiety     Cancer     Cerebral arterial aneurysm     Depression     Epilepsy 2012    LAST SEIZURE 2017    Gestational diabetes     H/O breast biopsy     Hyperglycemia     Hyperlipidemia     Migraines     PONV (postoperative nausea and vomiting)     Spinal headache     FROM EPIDURAL HAD BLOOD PATCH X 2      Allergies   Allergen Reactions    Codeine Phosphate [Codeine] Hives     SWELLING OF THROAT    Sulfa Antibiotics Hives     ITCHING      Social History     Socioeconomic History    Marital status:    Tobacco Use    Smoking status: Former     Current packs/day: 0.00     Average packs/day: 0.5 packs/day for 28.0 years (14.0 ttl pk-yrs)     Types: Cigarettes     Start date:      Quit date:      Years since quittin.2     Passive exposure: Past    Smokeless tobacco: Never   Substance and Sexual Activity    Alcohol use: No    Drug use: No    Sexual activity: Defer        Current Outpatient Medications:     atorvastatin (LIPITOR) 80 MG tablet, Take 1 tablet by mouth Daily., Disp: 90 tablet, Rfl: 3    cholecalciferol (VITAMIN D3) 25 MCG (1000 UT) tablet, Take 1 tablet by mouth Daily., Disp: , Rfl:     Coenzyme Q10 (COQ10) 200 MG capsule, Take 1 capsule by mouth 2 (Two) Times a Day., Disp: , Rfl:     ezetimibe (Zetia) 10 MG tablet, Take 1 tablet by mouth Daily., Disp: 90 tablet, Rfl: 3    hydrOXYzine (ATARAX) 25 MG tablet, Take 1 tablet by mouth 3 (Three) Times a Day As Needed for Itching or Anxiety., Disp: 40 tablet, Rfl: 1    magnesium oxide (MAGOX) 400 (241.3 Mg) MG tablet tablet, Take 1 tablet by mouth Daily., Disp: , Rfl:     REPLENS VAGINAL MOISTURIZER VA, Insert 1 each into  the vagina., Disp: , Rfl:     sertraline (ZOLOFT) 100 MG tablet, Take 1 tablet by mouth Daily., Disp: 90 tablet, Rfl: 1    topiramate (TOPAMAX) 50 MG tablet, Take 2 tablets by mouth 2 (Two) Times a Day., Disp: 360 tablet, Rfl: 0    cefdinir (OMNICEF) 300 MG capsule, Take 1 capsule by mouth 2 (Two) Times a Day., Disp: 20 capsule, Rfl: 0     Objective     History of Present Illness  This patient is here to follow-up on hypercholesterolemia, hyperglycemia.    She feels well.  Hyperlipidemia         Review of Systems   Constitutional: Negative.    HENT: Negative.     Respiratory: Negative.     Cardiovascular: Negative.    Musculoskeletal: Negative.    Psychiatric/Behavioral: Negative.         Physical Exam  Vitals and nursing note reviewed.   Constitutional:       Appearance: Normal appearance.      Comments: Pleasant, neatly groomed, no distress.   Cardiovascular:      Rate and Rhythm: Regular rhythm.      Heart sounds: Normal heart sounds. No murmur heard.     No gallop.   Pulmonary:      Effort: No respiratory distress.      Breath sounds: Normal breath sounds. No wheezing or rales.   Neurological:      Mental Status: She is alert and oriented to person, place, and time.   Psychiatric:         Mood and Affect: Mood normal.         Behavior: Behavior normal.         Thought Content: Thought content normal.           Problems Addressed this Visit          Cardiac and Vasculature    Hypercholesterolemia - Primary    Relevant Medications    atorvastatin (LIPITOR) 80 MG tablet       Endocrine and Metabolic    Hyperglycemia     Diagnoses         Codes Comments    Hypercholesterolemia    -  Primary ICD-10-CM: E78.00  ICD-9-CM: 272.0     Hyperglycemia     ICD-10-CM: R73.9  ICD-9-CM: 790.29               PLAN  She has hyperglycemia which is not sufficient to say she has type 2 diabetes.  She enjoys an appropriate weight she gets regular aerobic activity.  We are just going to check labs on her in about 3 to 4 months months to  follow-up on this.    She has hypercholesterolemia she is currently on Zetia and atorvastatin 40 mg.  I asked her to increase her atorvastatin to 80 mg daily.    Fasting labs week before next appointment should include: Lipid profile, hemoglobin A1c, comprehensive metabolic panel, CBC, urinalysis.      No follow-ups on file.

## 2024-04-17 ENCOUNTER — HOSPITAL ENCOUNTER (OUTPATIENT)
Dept: CT IMAGING | Facility: HOSPITAL | Age: 59
Discharge: HOME | End: 2024-04-17
Admitting: NURSE PRACTITIONER
Payer: MEDICARE

## 2024-04-17 DIAGNOSIS — I67.1 CEREBRAL ANEURYSM: ICD-10-CM

## 2024-04-17 PROCEDURE — 70496 CT ANGIOGRAPHY HEAD: CPT

## 2024-04-17 PROCEDURE — 25510000001 IOPAMIDOL PER 1 ML: Performed by: NURSE PRACTITIONER

## 2024-04-17 PROCEDURE — 82565 ASSAY OF CREATININE: CPT

## 2024-04-17 RX ADMIN — IOPAMIDOL 95 ML: 755 INJECTION, SOLUTION INTRAVENOUS at 17:08

## 2024-04-18 LAB — CREAT BLDA-MCNC: 0.7 MG/DL (ref 0.6–1.3)

## 2024-04-24 RX ORDER — SERTRALINE HYDROCHLORIDE 100 MG/1
100 TABLET, FILM COATED ORAL DAILY
Qty: 90 TABLET | Refills: 0 | Status: SHIPPED | OUTPATIENT
Start: 2024-04-24

## 2024-04-24 NOTE — PROGRESS NOTES
Subjective   Patient ID: Lissa Tavarez is a 59 y.o. female is here today for follow-up for CTA Head done on 4/17/24.    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  Angiogram findings at that time revealed 100% occlusion of the previously treated aneurysm.  She follows up today with new CTA head imaging    She continues with chronic problems that she has had since her first aneurysm embolization in 2006.  She reports a constant pressure behind the left eye and intermittent headaches.  She is on Topamax.  She denies any recent seizure activity.  She also reports some baseline balance and gait issues, but she denies any falls.  She was diagnosed with bladder cancer 1 year ago, but fortunately is now in remission after undergoing immunotherapy for treatment.  She denies any new problems or concerns today.    She presents unaccompanied.    The following portions of the patient's history were reviewed and updated as appropriate: allergies, current medications, past family history, past medical history, past social history, past surgical history, and problem list.    Review of Systems   Eyes:  Negative for visual disturbance.   Neurological:  Positive for dizziness, light-headedness and headaches. Negative for seizures, speech difficulty and numbness.   Psychiatric/Behavioral:  Negative for confusion.        /70   Pulse 71   SpO2 99%       Objective     Vitals:    04/25/24 1000   BP: 120/70   Pulse: 71   SpO2: 99%     There is no height or weight on file to calculate BMI.    Tobacco Use: Medium Risk (4/25/2024)    Patient History     Smoking Tobacco Use: Former     Smokeless Tobacco Use: Never     Passive Exposure: Past          Physical Exam  Vitals reviewed.   Constitutional:       Appearance: Normal appearance.   HENT:      Head: Atraumatic.    Pulmonary:      Effort: Pulmonary effort is normal.   Musculoskeletal:         General: Normal range of motion.      Comments: Strength appears equal and intact throughout  Moving all extremities well   Skin:     General: Skin is warm and dry.   Neurological:      Mental Status: She is alert and oriented to person, place, and time. Mental status is at baseline.      GCS: GCS eye subscore is 4. GCS verbal subscore is 5. GCS motor subscore is 6.      Cranial Nerves: No cranial nerve deficit, dysarthria or facial asymmetry.      Sensory: No sensory deficit.      Motor: No weakness or tremor.      Gait: Gait normal.      Deep Tendon Reflexes:      Reflex Scores:       Tricep reflexes are 2+ on the right side and 2+ on the left side.       Bicep reflexes are 2+ on the right side and 2+ on the left side.       Brachioradialis reflexes are 2+ on the right side and 2+ on the left side.     Comments: Gait is stable and upright, nonantalgic   Psychiatric:         Mood and Affect: Mood normal.       Neurologic Exam     Mental Status   Oriented to person, place, and time.     Gait, Coordination, and Reflexes     Reflexes   Right brachioradialis: 2+  Left brachioradialis: 2+  Right biceps: 2+  Left biceps: 2+  Right triceps: 2+  Left triceps: 2+          Assessment & Plan   Independent Review of Radiographic Studies:      I personally reviewed the images from the following studies.    CT[facility] head from [facility] dated April 17, 2024 reveals extensive artifact related to the coils and to a lesser extent the aneurysm clip.  No obvious residual or recurrent aneurysm is identified.  No evidence of any acute findings identified, including infarction, hemorrhage or hydrocephalus.    Medical Decision Making:      She presents the office today for 5-year follow-up with repeat CTA head imaging for history of a coiled and clipped aneurysm.  Exam as noted above, no red flags.  She is completely intact on exam today, but does continue with some  residual problems that has been present since the initial embolization procedure in 2006.  These include pain and pressure behind the left eye and intermittent headaches.  Overall her symptoms are well-controlled with medications.  CTA showed stable findings with no evidence of residual or recurrent aneurysm, as well as no new abnormalities.  She is very pleased with these results.  She is doing well after being treated for bladder cancer a year ago.  From our standpoint she is stable.  If she should develop any sudden onset neurochanges, she should report to the emergency room.  Or she can call our office with any questions or concerns.  We will see her back in 5 years with new CTA head imaging.  She is agreeable with this plan.    Plan: Follow-up in 5 years with CTA head imaging    Diagnoses and all orders for this visit:    1. Cerebral aneurysm, nonruptured (Primary)  -     CT Angiogram Head With Contrast; Future    2. History of craniotomy  -     CT Angiogram Head With Contrast; Future      Return in about 5 years (around 4/25/2029).

## 2024-04-25 ENCOUNTER — OFFICE VISIT (OUTPATIENT)
Dept: NEUROSURGERY | Facility: CLINIC | Age: 59
End: 2024-04-25
Payer: MEDICARE

## 2024-04-25 VITALS — DIASTOLIC BLOOD PRESSURE: 70 MMHG | SYSTOLIC BLOOD PRESSURE: 120 MMHG | HEART RATE: 71 BPM | OXYGEN SATURATION: 99 %

## 2024-04-25 DIAGNOSIS — I67.1 CEREBRAL ANEURYSM, NONRUPTURED: Primary | ICD-10-CM

## 2024-04-25 DIAGNOSIS — Z98.890 HISTORY OF CRANIOTOMY: ICD-10-CM

## 2024-04-25 RX ORDER — AMOXICILLIN 500 MG/1
1 CAPSULE ORAL 3 TIMES DAILY
COMMUNITY
Start: 2024-04-20

## 2024-05-16 RX ORDER — TOPIRAMATE 50 MG/1
100 TABLET, FILM COATED ORAL 2 TIMES DAILY
Qty: 360 TABLET | Refills: 0 | Status: SHIPPED | OUTPATIENT
Start: 2024-05-16

## 2024-07-09 ENCOUNTER — OFFICE VISIT (OUTPATIENT)
Dept: FAMILY MEDICINE CLINIC | Facility: CLINIC | Age: 59
End: 2024-07-09
Payer: MEDICARE

## 2024-07-09 VITALS
BODY MASS INDEX: 23.46 KG/M2 | TEMPERATURE: 97.3 F | WEIGHT: 132.4 LBS | RESPIRATION RATE: 16 BRPM | HEART RATE: 60 BPM | SYSTOLIC BLOOD PRESSURE: 118 MMHG | DIASTOLIC BLOOD PRESSURE: 82 MMHG | OXYGEN SATURATION: 99 % | HEIGHT: 63 IN

## 2024-07-09 DIAGNOSIS — E78.00 HYPERCHOLESTEROLEMIA: Primary | ICD-10-CM

## 2024-07-09 PROCEDURE — 1126F AMNT PAIN NOTED NONE PRSNT: CPT | Performed by: INTERNAL MEDICINE

## 2024-07-09 PROCEDURE — 99213 OFFICE O/P EST LOW 20 MIN: CPT | Performed by: INTERNAL MEDICINE

## 2024-07-09 RX ORDER — ROSUVASTATIN CALCIUM 20 MG/1
20 TABLET, COATED ORAL DAILY
Qty: 90 TABLET | Refills: 3 | Status: SHIPPED | OUTPATIENT
Start: 2024-07-09

## 2024-07-09 NOTE — PROGRESS NOTES
Subjective   Lissa Tavarez is a 59 y.o. female. Patient is here today for   Chief Complaint   Patient presents with    Hyperlipidemia          Vitals:    24 0939   BP: 118/82   Pulse: 60   Resp: 16   Temp: 97.3 °F (36.3 °C)   SpO2: 99%     Body mass index is 23.46 kg/m².      Past Medical History:   Diagnosis Date    Anxiety     Cancer     Cerebral arterial aneurysm     Depression     Epilepsy 2012    LAST SEIZURE 2017    Gestational diabetes     H/O breast biopsy     Hyperglycemia     Hyperlipidemia     Migraines     PONV (postoperative nausea and vomiting)     Spinal headache     FROM EPIDURAL HAD BLOOD PATCH X 2      Allergies   Allergen Reactions    Codeine Hives and Nausea And Vomiting     SWELLING OF THROAT    Rash along with nausea and vomiting   SWELLING OF THROAT   SWELLING OF THROAT   <paragraph>SWELLING OF THROAT</paragraph>    Sulfa Antibiotics Hives     ITCHING    ITCHING   <paragraph>ITCHING</paragraph>      Social History     Socioeconomic History    Marital status:    Tobacco Use    Smoking status: Former     Current packs/day: 0.00     Average packs/day: 0.5 packs/day for 28.0 years (14.0 ttl pk-yrs)     Types: Cigarettes     Start date:      Quit date:      Years since quittin.5     Passive exposure: Past    Smokeless tobacco: Never   Vaping Use    Vaping status: Never Used   Substance and Sexual Activity    Alcohol use: No    Drug use: No    Sexual activity: Defer        Current Outpatient Medications:     cholecalciferol (VITAMIN D3) 25 MCG (1000 UT) tablet, Take 1 tablet by mouth Daily., Disp: , Rfl:     Coenzyme Q10 (COQ10) 200 MG capsule, Take 1 capsule by mouth 2 (Two) Times a Day., Disp: , Rfl:     ezetimibe (Zetia) 10 MG tablet, Take 1 tablet by mouth Daily., Disp: 90 tablet, Rfl: 3    magnesium oxide (MAGOX) 400 (241.3 Mg) MG tablet tablet, Take 1 tablet by mouth Daily., Disp: , Rfl:     REPLENS VAGINAL MOISTURIZER VA, Insert 1 each into the vagina., Disp: ,  Rfl:     sertraline (ZOLOFT) 100 MG tablet, Take 1 tablet by mouth once daily, Disp: 90 tablet, Rfl: 0    topiramate (TOPAMAX) 50 MG tablet, Take 2 tablets by mouth twice daily, Disp: 360 tablet, Rfl: 0    rosuvastatin (Crestor) 20 MG tablet, Take 1 tablet by mouth Daily., Disp: 90 tablet, Rfl: 3     Objective     History of Present Illness  It is always a pleasure to see Ms. Tavarez.  She is feeling well.  She continues to follow-up with urology regarding her history of bladder cancer.    She has an upcoming appointment for a screening bladder CT scan later this month.    Otherwise she is feeling well overall.       Review of Systems   Constitutional: Negative.    HENT: Negative.     Respiratory: Negative.     Cardiovascular: Negative.    Musculoskeletal: Negative.    Psychiatric/Behavioral: Negative.         Physical Exam  Vitals and nursing note reviewed.   Constitutional:       Appearance: Normal appearance.      Comments: Pleasant, neatly groomed, no distress.   Cardiovascular:      Rate and Rhythm: Regular rhythm.      Heart sounds: Normal heart sounds. No murmur heard.     No gallop.   Pulmonary:      Effort: No respiratory distress.      Breath sounds: Normal breath sounds. No wheezing or rales.   Neurological:      Mental Status: She is alert and oriented to person, place, and time.   Psychiatric:         Mood and Affect: Mood normal.         Behavior: Behavior normal.         Thought Content: Thought content normal.           Problems Addressed this Visit          Cardiac and Vasculature    Hypercholesterolemia - Primary    Relevant Medications    rosuvastatin (Crestor) 20 MG tablet     Diagnoses         Codes Comments    Hypercholesterolemia    -  Primary ICD-10-CM: E78.00  ICD-9-CM: 272.0               PLAN  She needs better control of her hypercholesterolemia.  She has developed some muscle aches and pains with her 80 mg dose of atorvastatin daily along with Zetia 10 mg daily.    I asked him to her to  discontinue her atorvastatin, continue Zetia 10 mg once daily and in addition take rosuvastatin 20 mg daily.    Would like to have her back in about 3 months.  Target LDL cholesterol should be less than 70 and a maximum.    Although she is achieve this goal on atorvastatin and Zetia her muscular pain especially affecting her legs is causing some significant adverse side effects.    Follow-up 3 months.  Fasting labs prior to that visit should include lipid profile, comprehensive metabolic panel, creatinine phosphokinase  No follow-ups on file.

## 2024-07-17 RX ORDER — SERTRALINE HYDROCHLORIDE 100 MG/1
100 TABLET, FILM COATED ORAL DAILY
Qty: 90 TABLET | Refills: 0 | Status: SHIPPED | OUTPATIENT
Start: 2024-07-17

## 2024-08-09 RX ORDER — TOPIRAMATE 50 MG/1
100 TABLET, FILM COATED ORAL 2 TIMES DAILY
Qty: 360 TABLET | Refills: 0 | Status: SHIPPED | OUTPATIENT
Start: 2024-08-09

## 2024-08-20 ENCOUNTER — PATIENT MESSAGE (OUTPATIENT)
Dept: FAMILY MEDICINE CLINIC | Facility: CLINIC | Age: 59
End: 2024-08-20
Payer: MEDICARE

## 2024-10-10 RX ORDER — SERTRALINE HYDROCHLORIDE 100 MG/1
100 TABLET, FILM COATED ORAL DAILY
Qty: 90 TABLET | Refills: 0 | Status: SHIPPED | OUTPATIENT
Start: 2024-10-10

## 2024-10-18 ENCOUNTER — LAB (OUTPATIENT)
Dept: FAMILY MEDICINE CLINIC | Facility: CLINIC | Age: 59
End: 2024-10-18
Payer: MEDICARE

## 2024-10-18 DIAGNOSIS — R73.9 HYPERGLYCEMIA: Primary | ICD-10-CM

## 2024-10-18 DIAGNOSIS — E78.00 HYPERCHOLESTEROLEMIA: ICD-10-CM

## 2024-10-19 LAB
ALBUMIN SERPL-MCNC: 4.2 G/DL (ref 3.5–5.2)
ALBUMIN/GLOB SERPL: 1.7 G/DL
ALP SERPL-CCNC: 82 U/L (ref 39–117)
ALT SERPL-CCNC: 23 U/L (ref 1–33)
APPEARANCE UR: CLEAR
AST SERPL-CCNC: 20 U/L (ref 1–32)
BACTERIA #/AREA URNS HPF: NORMAL /HPF
BASOPHILS # BLD AUTO: 0.07 10*3/MM3 (ref 0–0.2)
BASOPHILS NFR BLD AUTO: 1.1 % (ref 0–1.5)
BILIRUB SERPL-MCNC: 0.4 MG/DL (ref 0–1.2)
BILIRUB UR QL STRIP: NEGATIVE
BUN SERPL-MCNC: 14 MG/DL (ref 8–23)
BUN/CREAT SERPL: 16.3 (ref 7–25)
CALCIUM SERPL-MCNC: 9.2 MG/DL (ref 8.6–10.5)
CASTS URNS MICRO: NORMAL
CHLORIDE SERPL-SCNC: 105 MMOL/L (ref 98–107)
CHOLEST SERPL-MCNC: 202 MG/DL (ref 0–200)
CO2 SERPL-SCNC: 27.2 MMOL/L (ref 22–29)
COLOR UR: ABNORMAL
CREAT SERPL-MCNC: 0.86 MG/DL (ref 0.57–1)
EGFRCR SERPLBLD CKD-EPI 2021: 77.5 ML/MIN/1.73
EOSINOPHIL # BLD AUTO: 0.09 10*3/MM3 (ref 0–0.4)
EOSINOPHIL NFR BLD AUTO: 1.5 % (ref 0.3–6.2)
EPI CELLS #/AREA URNS HPF: NORMAL /HPF
ERYTHROCYTE [DISTWIDTH] IN BLOOD BY AUTOMATED COUNT: 11.9 % (ref 12.3–15.4)
GLOBULIN SER CALC-MCNC: 2.5 GM/DL
GLUCOSE SERPL-MCNC: 126 MG/DL (ref 65–99)
GLUCOSE UR QL STRIP: NEGATIVE
HBA1C MFR BLD: 5.7 % (ref 4.8–5.6)
HCT VFR BLD AUTO: 41.9 % (ref 34–46.6)
HDLC SERPL-MCNC: 56 MG/DL (ref 40–60)
HGB BLD-MCNC: 13.3 G/DL (ref 12–15.9)
HGB UR QL STRIP: NEGATIVE
IMM GRANULOCYTES # BLD AUTO: 0.02 10*3/MM3 (ref 0–0.05)
IMM GRANULOCYTES NFR BLD AUTO: 0.3 % (ref 0–0.5)
KETONES UR QL STRIP: ABNORMAL
LDLC SERPL CALC-MCNC: 130 MG/DL (ref 0–100)
LDLC/HDLC SERPL: 2.29 {RATIO}
LEUKOCYTE ESTERASE UR QL STRIP: ABNORMAL
LYMPHOCYTES # BLD AUTO: 1.93 10*3/MM3 (ref 0.7–3.1)
LYMPHOCYTES NFR BLD AUTO: 31.1 % (ref 19.6–45.3)
MCH RBC QN AUTO: 29.2 PG (ref 26.6–33)
MCHC RBC AUTO-ENTMCNC: 31.7 G/DL (ref 31.5–35.7)
MCV RBC AUTO: 91.9 FL (ref 79–97)
MONOCYTES # BLD AUTO: 0.59 10*3/MM3 (ref 0.1–0.9)
MONOCYTES NFR BLD AUTO: 9.5 % (ref 5–12)
NEUTROPHILS # BLD AUTO: 3.5 10*3/MM3 (ref 1.7–7)
NEUTROPHILS NFR BLD AUTO: 56.5 % (ref 42.7–76)
NITRITE UR QL STRIP: NEGATIVE
NRBC BLD AUTO-RTO: 0 /100 WBC (ref 0–0.2)
PH UR STRIP: 6 [PH] (ref 5–8)
PLATELET # BLD AUTO: 281 10*3/MM3 (ref 140–450)
POTASSIUM SERPL-SCNC: 4.5 MMOL/L (ref 3.5–5.2)
PROT SERPL-MCNC: 6.7 G/DL (ref 6–8.5)
PROT UR QL STRIP: NEGATIVE
RBC # BLD AUTO: 4.56 10*6/MM3 (ref 3.77–5.28)
RBC #/AREA URNS HPF: NORMAL /HPF
SODIUM SERPL-SCNC: 141 MMOL/L (ref 136–145)
SP GR UR STRIP: 1.02 (ref 1–1.03)
TRIGL SERPL-MCNC: 89 MG/DL (ref 0–150)
UROBILINOGEN UR STRIP-MCNC: ABNORMAL MG/DL
VLDLC SERPL CALC-MCNC: 16 MG/DL (ref 5–40)
WBC # BLD AUTO: 6.2 10*3/MM3 (ref 3.4–10.8)
WBC #/AREA URNS HPF: NORMAL /HPF

## 2024-10-24 ENCOUNTER — OFFICE VISIT (OUTPATIENT)
Dept: FAMILY MEDICINE CLINIC | Facility: CLINIC | Age: 59
End: 2024-10-24
Payer: MEDICARE

## 2024-10-24 ENCOUNTER — TELEPHONE (OUTPATIENT)
Dept: FAMILY MEDICINE CLINIC | Facility: CLINIC | Age: 59
End: 2024-10-24

## 2024-10-24 VITALS
SYSTOLIC BLOOD PRESSURE: 116 MMHG | RESPIRATION RATE: 16 BRPM | TEMPERATURE: 96.8 F | BODY MASS INDEX: 24.01 KG/M2 | DIASTOLIC BLOOD PRESSURE: 78 MMHG | HEIGHT: 63 IN | HEART RATE: 67 BPM | WEIGHT: 135.5 LBS | OXYGEN SATURATION: 100 %

## 2024-10-24 DIAGNOSIS — F41.8 DEPRESSION WITH ANXIETY: ICD-10-CM

## 2024-10-24 DIAGNOSIS — E78.00 HYPERCHOLESTEROLEMIA: ICD-10-CM

## 2024-10-24 DIAGNOSIS — Z13.6 ENCOUNTER FOR SCREENING FOR VASCULAR DISEASE: Primary | ICD-10-CM

## 2024-10-24 DIAGNOSIS — R73.9 HYPERGLYCEMIA: ICD-10-CM

## 2024-10-24 DIAGNOSIS — R91.1 NODULE OF RIGHT LUNG: ICD-10-CM

## 2024-10-24 PROCEDURE — 99214 OFFICE O/P EST MOD 30 MIN: CPT | Performed by: INTERNAL MEDICINE

## 2024-10-24 PROCEDURE — 1126F AMNT PAIN NOTED NONE PRSNT: CPT | Performed by: INTERNAL MEDICINE

## 2024-10-24 RX ORDER — NITROFURANTOIN 25; 75 MG/1; MG/1
100 CAPSULE ORAL 2 TIMES DAILY
Qty: 14 CAPSULE | Refills: 0 | Status: SHIPPED | OUTPATIENT
Start: 2024-10-24

## 2024-10-24 RX ORDER — ATORVASTATIN CALCIUM 80 MG/1
1 TABLET, FILM COATED ORAL DAILY
COMMUNITY
Start: 2024-09-03 | End: 2024-10-25

## 2024-10-24 NOTE — TELEPHONE ENCOUNTER
Caller: Lissa Tavarez    Relationship: Self    Best call back number:     [phone]            What was the call regarding: PATIENT WAS IN THIS MORNING AND THOUGHT AN ANTIBIOTIC WAS BEING SENT TO [facility] FOR A UTI. THEY HAVE NOT RECEIVED ANYTHING YET. PLEASE ADVISE       [facility] [address] [place] [address] - [phone]  - [phone]  [phone]

## 2024-10-25 NOTE — PROGRESS NOTES
Subjective   Lissa Tavarez is a 59 y.o. female. Patient is here today for   Chief Complaint   Patient presents with    Hyperlipidemia        Hyperlipidemia      History of Present Illness  The patient is a 59-year-old female who presents for evaluation of multiple medical concerns.    She expresses concern about her recent blood work and urine samples, fearing a recurrence of her bladder cancer. Her next cystoscopy is scheduled for 2025. She has been experiencing frequent urination for the past week but reports no pain or unusual odor. She maintains good hygiene, taking regular baths and showers, and does not use any creams or lotions.    She underwent a CT scan in 2023, which revealed a few small micronodules. A follow-up scan was recommended in a year, but she missed it in 2024. The previous scan was performed with contrast.    She had brain vascular screening done and is good for 5 years.    SOCIAL HISTORY  She quit smoking on 2022 at 7:30 AM.    FAMILY HISTORY  Her father had multiple aortic aneurysms. He  of bleeding out. He quit smoking for 15 to 20 years. Her mother had stage 3 cancer.      Vitals:    10/24/24 1044   BP: 116/78   Pulse: 67   Resp: 16   Temp: 96.8 °F (36 °C)   SpO2: 100%     Body mass index is 24.01 kg/m².    Past Medical History:   Diagnosis Date    Anxiety     Cancer     Cerebral arterial aneurysm     Depression     Epilepsy 2012    LAST SEIZURE 2017    Gestational diabetes     H/O breast biopsy     Hyperglycemia     Hyperlipidemia     Migraines     PONV (postoperative nausea and vomiting)     Spinal headache     FROM EPIDURAL HAD BLOOD PATCH X 2      Allergies   Allergen Reactions    Codeine Hives and Nausea And Vomiting     SWELLING OF THROAT    Rash along with nausea and vomiting   SWELLING OF THROAT   SWELLING OF THROAT   <paragraph>SWELLING OF THROAT</paragraph>    Sulfa Antibiotics Hives     ITCHING    ITCHING   <paragraph>ITCHING</paragraph>      Social  History     Socioeconomic History    Marital status:    Tobacco Use    Smoking status: Former     Current packs/day: 0.00     Average packs/day: 0.5 packs/day for 28.0 years (14.0 ttl pk-yrs)     Types: Cigarettes     Start date:      Quit date: 2013     Years since quittin.8     Passive exposure: Past    Smokeless tobacco: Never   Vaping Use    Vaping status: Never Used   Substance and Sexual Activity    Alcohol use: No    Drug use: No    Sexual activity: Defer        Current Outpatient Medications:     cholecalciferol (VITAMIN D3) 25 MCG (1000 UT) tablet, Take 1 tablet by mouth Daily., Disp: , Rfl:     Coenzyme Q10 (COQ10) 200 MG capsule, Take 1 capsule by mouth 2 (Two) Times a Day., Disp: , Rfl:     ezetimibe (Zetia) 10 MG tablet, Take 1 tablet by mouth Daily., Disp: 90 tablet, Rfl: 3    magnesium oxide (MAGOX) 400 (241.3 Mg) MG tablet tablet, Take 1 tablet by mouth Daily., Disp: , Rfl:     REPLENS VAGINAL MOISTURIZER VA, Insert 1 each into the vagina., Disp: , Rfl:     rosuvastatin (Crestor) 20 MG tablet, Take 1 tablet by mouth Daily., Disp: 90 tablet, Rfl: 3    sertraline (ZOLOFT) 100 MG tablet, Take 1 tablet by mouth once daily, Disp: 90 tablet, Rfl: 0    topiramate (TOPAMAX) 50 MG tablet, Take 2 tablets by mouth twice daily, Disp: 360 tablet, Rfl: 0    nitrofurantoin, macrocrystal-monohydrate, (Macrobid) 100 MG capsule, Take 1 capsule by mouth 2 (Two) Times a Day., Disp: 14 capsule, Rfl: 0     Objective     Review of Systems   Constitutional: Negative.    HENT: Negative.     Respiratory: Negative.     Cardiovascular: Negative.    Musculoskeletal: Negative.    Psychiatric/Behavioral: Negative.         Physical Exam  Vitals and nursing note reviewed.   Constitutional:       Appearance: Normal appearance.      Comments: Pleasant hallways, neatly groomed, no distress.   Cardiovascular:      Rate and Rhythm: Regular rhythm.      Heart sounds: Normal heart sounds. No murmur heard.     No gallop.    Pulmonary:      Effort: No respiratory distress.      Breath sounds: Normal breath sounds. No wheezing or rales.   Neurological:      Mental Status: She is alert and oriented to person, place, and time.   Psychiatric:         Mood and Affect: Mood normal.         Behavior: Behavior normal.         Thought Content: Thought content normal.       Physical Exam      Results  Laboratory Studies  Urine test showed a little bit of ketones, trace of white blood cells, and dark yellow color.    Assessment & Plan    Problems Addressed this Visit          Cardiac and Vasculature    Hypercholesterolemia       Endocrine and Metabolic    Hyperglycemia       Mental Health    Depression with anxiety     Other Visit Diagnoses       Encounter for screening for vascular disease    -  Primary    Relevant Orders    Vascular Screening (Bundle) CAR    Nodule of right lung        Relevant Orders    CT Chest With Contrast          Diagnoses         Codes Comments    Encounter for screening for vascular disease    -  Primary ICD-10-CM: Z13.6  ICD-9-CM: V81.2     Nodule of right lung     ICD-10-CM: R91.1  ICD-9-CM: 793.11     Hypercholesterolemia     ICD-10-CM: E78.00  ICD-9-CM: 272.0     Depression with anxiety     ICD-10-CM: F41.8  ICD-9-CM: 300.4     Hyperglycemia     ICD-10-CM: R73.9  ICD-9-CM: 790.29           Assessment & Plan  1. Urinary Tract Infection.  The presence of ketones in her urine is within normal limits. Trace amounts of white blood cells were detected, and the dark yellow color of her urine suggests mild dehydration. She reported more frequent urination over the last week but no pain or strange odor. A prescription for an antibiotic, to be taken twice daily for 5 days, was provided. She was advised to increase her water intake.    2. Micronodules in the Right Lung.  The radiologist's report indicates several sub-5 mm micronodules within the right lung. A CT scan of the chest was ordered for further evaluation. The  patient missed the follow-up in August, so the CT scan will be scheduled now to monitor the micronodules.    3. Increased Risk of Aneurysms.  Given her family history of multiple aortic aneurysms, a vascular screening was recommended. An order has been placed for this screening, and she will receive a call to schedule it.    4. Health Maintenance.  She is due for her next cystoscopy on February 21, 2025.     Follow-up  Return in 4 to 6 months for a Medicare annual wellness check.      No follow-ups on file.    Patient or patient representative verbalized consent for the use of Ambient Listening during the visit with  Aries Morejon MD for chart documentation. 10/25/2024  11:11 EDT  Answers submitted by the patient for this visit:  Other (Submitted on 10/22/2024)  Please describe your symptoms.: Lab follow up  Have you had these symptoms before?: Yes  How long have you been having these symptoms?: Greater than 2 weeks  Please list any medications you are currently taking for this condition.: On file  Please describe any probable cause for these symptoms. : On file  Primary Reason for Visit (Submitted on 10/22/2024)  What is the primary reason for your visit?: Problem Not Listed

## 2024-11-01 RX ORDER — TOPIRAMATE 50 MG/1
100 TABLET, FILM COATED ORAL 2 TIMES DAILY
Qty: 360 TABLET | Refills: 0 | Status: SHIPPED | OUTPATIENT
Start: 2024-11-01

## 2024-11-12 ENCOUNTER — HOSPITAL ENCOUNTER (OUTPATIENT)
Dept: CT IMAGING | Facility: HOSPITAL | Age: 59
Discharge: HOME | End: 2024-11-12
Admitting: INTERNAL MEDICINE
Payer: MEDICARE

## 2024-11-12 DIAGNOSIS — R91.1 NODULE OF RIGHT LUNG: ICD-10-CM

## 2024-11-12 PROCEDURE — 25510000001 IOPAMIDOL 61 % SOLUTION: Performed by: INTERNAL MEDICINE

## 2024-11-12 PROCEDURE — 71260 CT THORAX DX C+: CPT

## 2024-11-12 RX ORDER — IOPAMIDOL 612 MG/ML
100 INJECTION, SOLUTION INTRAVASCULAR
Status: COMPLETED | OUTPATIENT
Start: 2024-11-12 | End: 2024-11-12

## 2024-11-12 RX ADMIN — IOPAMIDOL 75 ML: 612 INJECTION, SOLUTION INTRAVENOUS at 15:09

## 2024-11-13 ENCOUNTER — HOSPITAL ENCOUNTER (OUTPATIENT)
Dept: CARDIOLOGY | Facility: HOSPITAL | Age: 59
Discharge: HOME | End: 2024-11-13
Admitting: INTERNAL MEDICINE

## 2024-11-13 DIAGNOSIS — Z13.6 ENCOUNTER FOR SCREENING FOR VASCULAR DISEASE: ICD-10-CM

## 2024-11-13 LAB
BH CV ECHO MEAS - DIST AO DIAM: 1.36 CM
BH CV VAS BP LEFT ARM: NORMAL MMHG
BH CV VAS BP RIGHT ARM: NORMAL MMHG
BH CV VAS SCREENING CAROTID CCA LEFT: NORMAL CM/SEC
BH CV VAS SCREENING CAROTID CCA RIGHT: NORMAL CM/SEC
BH CV VAS SCREENING CAROTID ICA LEFT: NORMAL CM/SEC
BH CV VAS SCREENING CAROTID ICA RIGHT: NORMAL CM/SEC
BH CV XLRA MEAS - MID AO DIAM: 1.71 CM
BH CV XLRA MEAS - PAD LEFT ABI DP: 0.9
BH CV XLRA MEAS - PAD LEFT ABI PT: 0.95
BH CV XLRA MEAS - PAD LEFT ARM: 113 MMHG
BH CV XLRA MEAS - PAD LEFT LEG DP: 116 MMHG
BH CV XLRA MEAS - PAD LEFT LEG PT: 117 MMHG
BH CV XLRA MEAS - PAD RIGHT ABI DP: 0.91
BH CV XLRA MEAS - PAD RIGHT ABI PT: 0.96
BH CV XLRA MEAS - PAD RIGHT ARM: 129 MMHG
BH CV XLRA MEAS - PAD RIGHT LEG DP: 123 MMHG
BH CV XLRA MEAS - PAD RIGHT LEG PT: 124 MMHG
BH CV XLRA MEAS - PROX AO DIAM: 1.68 CM
BH CV XLRA MEAS LEFT ICA/CCA RATIO: 0.99
BH CV XLRA MEAS LEFT PROX ECA PSV: NORMAL CM/SEC
BH CV XLRA MEAS RIGHT ICA/CCA RATIO: 0.67
BH CV XLRA MEAS RIGHT PROX ECA PSV: NORMAL CM/SEC
MAXIMAL PREDICTED HEART RATE: 160 BPM
STRESS TARGET HR: 136 BPM

## 2024-11-13 PROCEDURE — 93799 UNLISTED CV SVC/PROCEDURE: CPT

## 2024-11-27 RX ORDER — EZETIMIBE 10 MG/1
10 TABLET ORAL DAILY
Qty: 90 TABLET | Refills: 0 | Status: SHIPPED | OUTPATIENT
Start: 2024-11-27

## 2025-01-02 RX ORDER — SERTRALINE HYDROCHLORIDE 100 MG/1
100 TABLET, FILM COATED ORAL DAILY
Qty: 90 TABLET | Refills: 0 | Status: SHIPPED | OUTPATIENT
Start: 2025-01-02

## 2025-01-27 RX ORDER — TOPIRAMATE 50 MG/1
100 TABLET, FILM COATED ORAL 2 TIMES DAILY
Qty: 360 TABLET | Refills: 0 | Status: SHIPPED | OUTPATIENT
Start: 2025-01-27

## 2025-03-03 ENCOUNTER — OFFICE VISIT (OUTPATIENT)
Dept: FAMILY MEDICINE CLINIC | Facility: CLINIC | Age: 60
End: 2025-03-03
Payer: MEDICARE

## 2025-03-03 VITALS
RESPIRATION RATE: 16 BRPM | DIASTOLIC BLOOD PRESSURE: 78 MMHG | HEART RATE: 79 BPM | TEMPERATURE: 96.4 F | BODY MASS INDEX: 24.89 KG/M2 | OXYGEN SATURATION: 98 % | WEIGHT: 140.5 LBS | HEIGHT: 63 IN | SYSTOLIC BLOOD PRESSURE: 118 MMHG

## 2025-03-03 DIAGNOSIS — Z00.00 MEDICARE ANNUAL WELLNESS VISIT, SUBSEQUENT: ICD-10-CM

## 2025-03-03 DIAGNOSIS — F41.8 DEPRESSION WITH ANXIETY: ICD-10-CM

## 2025-03-03 DIAGNOSIS — E78.00 HYPERCHOLESTEROLEMIA: Primary | ICD-10-CM

## 2025-03-03 DIAGNOSIS — F41.1 GENERALIZED ANXIETY DISORDER: ICD-10-CM

## 2025-03-03 RX ORDER — SERTRALINE HYDROCHLORIDE 150 MG/1
150 CAPSULE ORAL DAILY
Qty: 90 CAPSULE | Refills: 3 | Status: SHIPPED | OUTPATIENT
Start: 2025-03-03

## 2025-03-03 RX ORDER — ROSUVASTATIN CALCIUM 40 MG/1
40 TABLET, COATED ORAL DAILY
Qty: 90 TABLET | Refills: 3 | Status: SHIPPED | OUTPATIENT
Start: 2025-03-03

## 2025-03-03 NOTE — PROGRESS NOTES
Subjective   The ABCs of the Annual Wellness Visit  Medicare Wellness Visit      Lissa Tavarez is a 60 y.o. patient who presents for a Medicare Wellness Visit.    The following portions of the patient's history were reviewed and   updated as appropriate: allergies, current medications, past family history, past medical history, past social history, past surgical history, and problem list.    Compared to one year ago, the patient's physical   health is worse.  Compared to one year ago, the patient's mental   health is worse.    Recent Hospitalizations:  She was not admitted to the hospital during the last year.     Current Medical Providers:  Patient Care Team:  Aries Morejon MD as PCP - General (Internal Medicine)  Billy Pinedo MD as Consulting Physician (Neurology)    Outpatient Medications Prior to Visit   Medication Sig Dispense Refill    cholecalciferol (VITAMIN D3) 25 MCG (1000 UT) tablet Take 1 tablet by mouth Daily.      Coenzyme Q10 (COQ10) 200 MG capsule Take 1 capsule by mouth 2 (Two) Times a Day.      ezetimibe (ZETIA) 10 MG tablet Take 1 tablet by mouth once daily 90 tablet 0    magnesium oxide (MAGOX) 400 (241.3 Mg) MG tablet tablet Take 1 tablet by mouth Daily.      rosuvastatin (Crestor) 20 MG tablet Take 1 tablet by mouth Daily. 90 tablet 3    sertraline (ZOLOFT) 100 MG tablet Take 1 tablet by mouth once daily 90 tablet 0    topiramate (TOPAMAX) 50 MG tablet Take 2 tablets by mouth twice daily 360 tablet 0    nitrofurantoin, macrocrystal-monohydrate, (Macrobid) 100 MG capsule Take 1 capsule by mouth 2 (Two) Times a Day. 14 capsule 0    REPLENS VAGINAL MOISTURIZER VA Insert 1 each into the vagina.       No facility-administered medications prior to visit.     No opioid medication identified on active medication list. I have reviewed chart for other potential  high risk medication/s and harmful drug interactions in the elderly.      Aspirin is not on active medication list.  Aspirin use is  "not indicated based on review of current medical condition/s. Risk of harm outweighs potential benefits.  .    Patient Active Problem List   Diagnosis    Difficulty hearing    Generalized anxiety disorder    Hypercholesterolemia    Seizure disorder    Unspecified visual disturbance    Abdominal pain, chronic, right upper quadrant    Acute bacterial rhinosinusitis    Epigastric pain    Migraine with aura and without status migrainosus, not intractable    Weight loss, unintentional    Cerebral aneurysm, nonruptured    History of craniotomy    Cerebral aneurysm    Acute bronchitis    Diarrhea    Depression with anxiety    Hearing loss    Hyperglycemia    Nasal discharge    Otitis media    Skin lesion    Tinea corporis    Hair loss    Medicare annual wellness visit, subsequent    Axillary pain, left    Encounter for screening for lung cancer     Advance Care Planning Advance Directive is not on file.  ACP discussion was held with the patient during this visit. Patient does not have an advance directive, information provided.            Objective   Vitals:    03/03/25 1500   BP: 118/78   BP Location: Right arm   Patient Position: Sitting   Cuff Size: Adult   Pulse: 79   Resp: 16   Temp: 96.4 °F (35.8 °C)   TempSrc: Temporal   SpO2: 98%   Weight: 63.7 kg (140 lb 8 oz)   Height: 160 cm (62.99\")       Estimated body mass index is 24.89 kg/m² as calculated from the following:    Height as of this encounter: 160 cm (62.99\").    Weight as of this encounter: 63.7 kg (140 lb 8 oz).    BMI is within normal parameters. No other follow-up for BMI required.           Does the patient have evidence of cognitive impairment? No  Lab Results   Component Value Date    CHLPL 267 (H) 02/25/2025    TRIG 117 02/25/2025    HDL 51 02/25/2025     (H) 02/25/2025    VLDL 21 02/25/2025    HGBA1C 6.1 (H) 02/25/2025                                                                                                Health  Risk " Assessment    Smoking Status:  Social History     Tobacco Use   Smoking Status Former    Current packs/day: 0.00    Average packs/day: 0.5 packs/day for 28.0 years (14.0 ttl pk-yrs)    Types: Cigarettes    Start date:     Quit date:     Years since quittin.1    Passive exposure: Past   Smokeless Tobacco Never     Alcohol Consumption:  Social History     Substance and Sexual Activity   Alcohol Use No       Fall Risk Screen  STEADI Fall Risk Assessment was completed, and patient is at LOW risk for falls.Assessment completed on:3/3/2025    Depression Screening   Little interest or pleasure in doing things? Not at all   Feeling down, depressed, or hopeless? Not at all   PHQ-2 Total Score 0      Health Habits and Functional and Cognitive Screening:      3/1/2025     9:07 AM   Functional & Cognitive Status   Do you have difficulty preparing food and eating? No    Do you have difficulty bathing yourself, getting dressed or grooming yourself? No    Do you have difficulty using the toilet? No    Do you have difficulty moving around from place to place? No    Do you have trouble with steps or getting out of a bed or a chair? No    Current Diet Low Carb Diet    Dental Exam Up to date    Eye Exam Not up to date    Exercise (times per week) 2 times per week    Current Exercises Include Walking    Do you need help using the phone?  No    Are you deaf or do you have serious difficulty hearing?  No    Do you need help to go to places out of walking distance? No    Do you need help shopping? No    Do you need help preparing meals?  No    Do you need help with housework?  No    Do you need help with laundry? No    Do you need help taking your medications? No    Do you need help managing money? No    Do you ever drive or ride in a car without wearing a seat belt? No    Have you felt unusual stress, anger or loneliness in the last month? No    Who do you live with? Alone    If you need help, do you have trouble finding  someone available to you? No    Have you been bothered in the last four weeks by sexual problems? No    Do you have difficulty concentrating, remembering or making decisions? Yes        Patient-reported           Age-appropriate Screening Schedule:  Refer to the list below for future screening recommendations based on patient's age, sex and/or medical conditions. Orders for these recommended tests are listed in the plan section. The patient has been provided with a written plan.    Health Maintenance List  Health Maintenance   Topic Date Due    TDAP/TD VACCINES (1 - Tdap) Never done    Pneumococcal Vaccine 50+ (1 of 1 - PCV) Never done    ZOSTER VACCINE (1 of 2) Never done    PAP SMEAR  Never done    INFLUENZA VACCINE  07/01/2024    COVID-19 Vaccine (3 - 2024-25 season) 09/01/2024    LIPID PANEL  02/25/2026    ANNUAL WELLNESS VISIT  03/03/2026    MAMMOGRAM  12/12/2026    COLORECTAL CANCER SCREENING  02/13/2027    HEPATITIS C SCREENING  Completed                                                                                                                                                CMS Preventative Services Quick Reference  Risk Factors Identified During Encounter  None Identified    The above risks/problems have been discussed with the patient.  Pertinent information has been shared with the patient in the After Visit Summary.  An After Visit Summary and PPPS were made available to the patient.    Follow Up:   Next Medicare Wellness visit to be scheduled in 1 year.         Additional E&M Note during same encounter follows:  Patient has additional, significant, and separately identifiable condition(s)/problem(s) that require work above and beyond the Medicare Wellness Visit     Chief Complaint  Medicare Wellness-subsequent    Subjective    HPI  Lissa is also being seen today for additional medical problem/s.       The patient is a 60-year-old female who presents for a Medicare wellness visit.    She reports no  "significant change in her physical health compared to the previous year. However, she notes a slight decline in her emotional well-being. She is currently on disability and has been prescribed an antidepressant, which she takes nightly. She engages in walking, sitting, and meditation as part of her routine. She maintains social connections and recently purchased a new house. She does not have an advanced directive in place.    She has a history of bladder cancer, which she attributes to smoking. She is scheduled for a Cystoscopy on Friday. She had a large tumor removed, which required her to use a catheter at home. She had several more tumors recur 6 months to a year later. Her last scan was supposed to be in February, but it was rescheduled to March 7th. She quit smoking on August 31, 2023, and used nicotine gum to help her quit.    She sought medical attention at an urgent care facility approximately one month ago due to a severe earache. She was prescribed amoxicillin 800 mg, which alleviated the pulsating sensation but did not completely resolve the pain. She continues to experience discomfort, particularly when chewing, and reports itching in the ear, which intensifies during the night.    She has a history of elevated cholesterol levels and is currently on rosuvastatin 20 mg daily, although she admits to occasionally forgetting to take it. She also takes an antidepressant every night.    SOCIAL HISTORY  The patient quit smoking on August 31, 2023. She does not drink alcohol.    FAMILY HISTORY  The patient's father had three aneurysms and experienced internal bleeding.    MEDICATIONS  Current: Rosuvastatin, sertraline, amoxicillin          Objective   Vital Signs:  /78 (BP Location: Right arm, Patient Position: Sitting, Cuff Size: Adult)   Pulse 79   Temp 96.4 °F (35.8 °C) (Temporal)   Resp 16   Ht 160 cm (62.99\")   Wt 63.7 kg (140 lb 8 oz)   SpO2 98%   BMI 24.89 kg/m²   Physical Exam    "               Results  Laboratory Studies  Total cholesterol is 267. In October, total cholesterol was 202 and LDL cholesterol was 130. In July, total cholesterol was 163 and LDL cholesterol was 97.              Assessment and Plan        1. Medicare wellness visit.  She reports feeling about the same physically compared to last year but emotionally a little worse. She is up to date with dental and eye exams. There are no cognitive issues noted. She is advised to ask for information on advanced directives at the end of the visit.    2. Bladder cancer.  She has a follow-up appointment with oncology on Friday for a Cystoscopy. She reports a history of multiple tumors and surgeries, including the use of a catheter post-surgery.    3. Hypercholesterolemia.  Her total cholesterol is 267, up from 202 in October and 163 in July. She is currently on rosuvastatin 20 mg but admits to not taking it consistently. She is advised to use a weekly pill planner to help remember her medications. The dosage of rosuvastatin will be increased to 40 mg daily.    4. Depression.  She is currently taking sertraline 100 mg nightly. The dosage will be increased to 150 mg daily. She is advised to use the [facility] rani to check the cost of the medication as her insurance does not cover the 150 mg capsules.    5. Ear pain.  She was treated with amoxicillin 800 mg for an ear infection a month ago, which helped somewhat but she still experiences pain and itching, especially at night. The ear appears clear upon examination.    Follow-up  The patient will follow up in 6 to 8 weeks.            Follow Up   No follow-ups on file.  Patient was given instructions and counseling regarding her condition or for health maintenance advice. Please see specific information pulled into the AVS if appropriate.  Patient or patient representative verbalized consent for the use of Ambient Listening during the visit with  Aries Morejon MD for chart documentation.  3/3/2025  15:21 EST

## 2025-04-01 RX ORDER — SERTRALINE HYDROCHLORIDE 150 MG/1
1 CAPSULE ORAL DAILY
Qty: 90 CAPSULE | Refills: 1 | Status: SHIPPED | OUTPATIENT
Start: 2025-04-01

## 2025-04-22 RX ORDER — TOPIRAMATE 50 MG/1
100 TABLET, FILM COATED ORAL 2 TIMES DAILY
Qty: 360 TABLET | Refills: 0 | Status: SHIPPED | OUTPATIENT
Start: 2025-04-22

## 2025-05-28 RX ORDER — SERTRALINE HYDROCHLORIDE 100 MG/1
100 TABLET, FILM COATED ORAL DAILY
Qty: 90 TABLET | Refills: 0 | OUTPATIENT
Start: 2025-05-28

## 2025-06-05 ENCOUNTER — OFFICE VISIT (OUTPATIENT)
Dept: FAMILY MEDICINE CLINIC | Facility: CLINIC | Age: 60
End: 2025-06-05
Payer: MEDICARE

## 2025-06-05 VITALS
DIASTOLIC BLOOD PRESSURE: 64 MMHG | HEIGHT: 63 IN | SYSTOLIC BLOOD PRESSURE: 92 MMHG | RESPIRATION RATE: 16 BRPM | WEIGHT: 135.5 LBS | BODY MASS INDEX: 24.01 KG/M2 | HEART RATE: 76 BPM | OXYGEN SATURATION: 95 %

## 2025-06-05 DIAGNOSIS — R73.9 HYPERGLYCEMIA: ICD-10-CM

## 2025-06-05 DIAGNOSIS — M26.621 TMJ TENDERNESS, RIGHT: ICD-10-CM

## 2025-06-05 DIAGNOSIS — E78.00 HYPERCHOLESTEROLEMIA: Primary | ICD-10-CM

## 2025-06-05 NOTE — PROGRESS NOTES
Subjective   Lissa Tavarez is a 60 y.o. female. Patient is here today for   Chief Complaint   Patient presents with    Primary Care Follow-Up     Medicine        History of Present Illness  She is being followed by Dr. Wolff for her history of transitional cell carcinoma of the bladder.    Last time I saw each other a few months ago she was feeling a bit overwhelmed and I increased her sertraline from 100 mg to 250 mg daily.  She has not yet started that medication but she tells me she will be starting it soon.    Otherwise, she feels well.    History of Present Illness  The patient presents for evaluation of ear pain, anxiety, and elevated cholesterol.    She has been experiencing persistent ear pain for several months, which she believes may be related to her jaw. The pain is localized in front of her ear and intensifies upon opening and closing her mouth. She has not sought any analgesic treatment for this discomfort. She has a history of bruxism but has never used a bite guard. She maintains regular dental check-ups, with 3 to 4 cleanings annually.    She has not yet initiated the increased dosage of sertraline, having inadvertently refilled her previous 100 mg prescription instead of the newly prescribed 150 mg. She reports no adverse effects from Zoloft. She has a history of Wellbutrin use during her brain surgery but did not find it beneficial.    Her LDL cholesterol level was 97 in 07/2024 when she was on 80 mg of Crestor. She had leg cramps while on Crestor 80 mg. She is currently on rosuvastatin 40 mg.    In March 2025, a CT scan revealed a recurrence of her cancer. She underwent surgery to remove the tumor. Despite initial thoughts by Dr. Ch that the tumor was too small and in a different spot to be cancerous, it was confirmed to be the same type of cancer. This marks the third recurrence of her cancer.      Vitals:    06/05/25 1007   BP: 92/64   Pulse: 76   Resp: 16   SpO2: 95%     Body mass  index is 24.01 kg/m².    Past Medical History:   Diagnosis Date    Anxiety     Cancer     Cerebral arterial aneurysm     Depression     Epilepsy 2012    LAST SEIZURE 2017    Gestational diabetes     H/O breast biopsy     Hyperglycemia     Hyperlipidemia     Migraines     PONV (postoperative nausea and vomiting)     Spinal headache     FROM EPIDURAL HAD BLOOD PATCH X 2      Allergies   Allergen Reactions    Codeine Hives and Nausea And Vomiting     SWELLING OF THROAT    Rash along with nausea and vomiting   SWELLING OF THROAT   SWELLING OF THROAT   <paragraph>SWELLING OF THROAT</paragraph>    Sulfa Antibiotics Hives     ITCHING    ITCHING   <paragraph>ITCHING</paragraph>      Social History     Socioeconomic History    Marital status:    Tobacco Use    Smoking status: Former     Current packs/day: 0.00     Average packs/day: 0.5 packs/day for 28.0 years (14.0 ttl pk-yrs)     Types: Cigarettes     Start date:      Quit date:      Years since quittin.4     Passive exposure: Past    Smokeless tobacco: Never   Vaping Use    Vaping status: Never Used   Substance and Sexual Activity    Alcohol use: No    Drug use: No    Sexual activity: Defer        Current Outpatient Medications:     cholecalciferol (VITAMIN D3) 25 MCG (1000 UT) tablet, Take 1 tablet by mouth Daily., Disp: , Rfl:     Coenzyme Q10 (COQ10) 200 MG capsule, Take 1 capsule by mouth 2 (Two) Times a Day., Disp: , Rfl:     ezetimibe (ZETIA) 10 MG tablet, Take 1 tablet by mouth once daily, Disp: 90 tablet, Rfl: 0    magnesium oxide (MAGOX) 400 (241.3 Mg) MG tablet tablet, Take 1 tablet by mouth Daily., Disp: , Rfl:     nitrofurantoin, macrocrystal-monohydrate, (Macrobid) 100 MG capsule, Take 1 capsule by mouth 2 (Two) Times a Day., Disp: 14 capsule, Rfl: 0    REPLENS VAGINAL MOISTURIZER VA, Insert 1 each into the vagina., Disp: , Rfl:     rosuvastatin (Crestor) 40 MG tablet, Take 1 tablet by mouth Daily., Disp: 90 tablet, Rfl: 3     Sertraline HCl 150 MG capsule, Take 150 mg by mouth Daily., Disp: 90 capsule, Rfl: 3    Sertraline HCl 150 MG capsule, Take 1 capsule by mouth Daily., Disp: 90 capsule, Rfl: 1    topiramate (TOPAMAX) 50 MG tablet, Take 2 tablets by mouth twice daily, Disp: 360 tablet, Rfl: 0    Meloxicam 15 MG tablet dispersible, Place 15 mg on the tongue Daily., Disp: 30 tablet, Rfl: 0     Objective     Review of Systems   Constitutional: Negative.    HENT: Negative.     Respiratory: Negative.     Cardiovascular: Negative.    Gastrointestinal: Negative.    Genitourinary: Negative.    Musculoskeletal:         She notes right sided TMJ pain   Neurological: Negative.    Psychiatric/Behavioral: Negative.         Physical Exam  Vitals and nursing note reviewed.   Constitutional:       Appearance: Normal appearance.      Comments: Pleasant, neatly groomed, no distress.   Cardiovascular:      Rate and Rhythm: Regular rhythm.      Heart sounds: Normal heart sounds. No murmur heard.     No gallop.   Pulmonary:      Effort: No respiratory distress.      Breath sounds: Normal breath sounds. No wheezing or rales.   Musculoskeletal:      Comments: She had pain on palpation of the right temporomandibular joint.  There is no crepitus on opening or closing of the jaw.   Neurological:      Mental Status: She is alert and oriented to person, place, and time.   Psychiatric:         Mood and Affect: Mood normal.         Behavior: Behavior normal.       Physical Exam  Ears: Cerumen present in the right ear canal  Mouth/Throat: Pain elicited with palpation in front of the right ear, temporomandibular joint tenderness  Musculoskeletal: Pain with opening and closing of the mouth, likely temporomandibular joint syndrome    Results  Labs   - LDL cholesterol: 02/2025, 195 mg/dL    Assessment & Plan    Problems Addressed this Visit          Cardiac and Vasculature    Hypercholesterolemia - Primary    Relevant Orders    Comprehensive Metabolic Panel    Lipid  Panel With LDL / HDL Ratio       ENT    TMJ tenderness, right       Endocrine and Metabolic    Hyperglycemia    Relevant Orders    CBC & Differential     Diagnoses         Codes Comments      Hypercholesterolemia    -  Primary ICD-10-CM: E78.00  ICD-9-CM: 272.0       Hyperglycemia     ICD-10-CM: R73.9  ICD-9-CM: 790.29       TMJ tenderness, right     ICD-10-CM: M26.621  ICD-9-CM: 524.62           Assessment & Plan  1. Temporomandibular joint syndrome.  - Symptoms suggest a diagnosis of temporomandibular joint syndrome, likely due to inflammation at the junction where the jaw meets the skull.  - Physical exam reveals pain in front of the ear and tenderness upon palpation.  - Discussed the possibility of teeth grinding and the potential need for a bite guard.  - A prescription for meloxicam, to be taken once daily with food for a duration of one month, has been provided. If the pain subsides, the medication can be discontinued. If there is no improvement, further evaluation will be necessary.    2. Anxiety.  - Patient has not yet started the increased dosage of sertraline 150 mg.  - Currently taking sertraline 100 mg without adverse side effects.  - Advised to increase the sertraline dosage to 150 mg once the current supply of 100 mg is depleted.  - Reassured that adverse side effects are unlikely with the increased dosage.    3. Elevated cholesterol.  - LDL cholesterol level was 195 in February, previously 97 in July when on 80 mg of Crestor.  - Patient is currently on 40 mg of rosuvastatin and has been consistent with the medication.  - Fasting labs have been ordered for next week to monitor cholesterol levels.    4. Cancer surveillance.  - Patient has a history of recurrent cancer, with the most recent recurrence requiring surgery.  - Regular follow-ups every 3 or 4 months are part of the ongoing surveillance plan.  - Encouraged to continue regular monitoring and reassured that current management is  appropriate.      No follow-ups on file.    Patient or patient representative verbalized consent for the use of Ambient Listening during the visit with  Aries Morejon MD for chart documentation. 6/5/2025  11:48 EDT

## 2025-06-12 RX ORDER — MELOXICAM 15 MG/1
15 TABLET ORAL DAILY
Qty: 30 TABLET | Refills: 1 | Status: SHIPPED | OUTPATIENT
Start: 2025-06-12

## 2025-07-21 ENCOUNTER — OFFICE VISIT (OUTPATIENT)
Dept: FAMILY MEDICINE CLINIC | Facility: CLINIC | Age: 60
End: 2025-07-21
Payer: MEDICARE

## 2025-07-21 VITALS
WEIGHT: 137.1 LBS | RESPIRATION RATE: 16 BRPM | HEIGHT: 63 IN | DIASTOLIC BLOOD PRESSURE: 74 MMHG | OXYGEN SATURATION: 98 % | BODY MASS INDEX: 24.29 KG/M2 | HEART RATE: 72 BPM | SYSTOLIC BLOOD PRESSURE: 104 MMHG

## 2025-07-21 DIAGNOSIS — F41.8 DEPRESSION WITH ANXIETY: Primary | ICD-10-CM

## 2025-07-21 RX ORDER — SERTRALINE HYDROCHLORIDE 100 MG/1
100 TABLET, FILM COATED ORAL DAILY
Qty: 90 TABLET | Refills: 3 | Status: SHIPPED | OUTPATIENT
Start: 2025-07-21

## 2025-07-25 NOTE — PROGRESS NOTES
Subjective   Lissa Tavarez is a 60 y.o. female. Patient is here today for   Chief Complaint   Patient presents with    Discuss medication concerns        History of Present Illness  History of Present Illness  The patient presents for evaluation of depression.    She began taking Zoloft 150 mg on 06/01/2025. Initially, she tolerated the medication well, but by the second week, she experienced severe throat burning and chest tightness, leading her to believe she was having a heart attack. The symptoms were so intense that she felt as if she might explode. She also had a strong urge to belch, which she was unable to do. Despite these side effects, she continued the medication until the following Friday. She discontinued the medication on Thursday night due to the severity of the side effects. She has been off the medication for 3 days and reports feeling worse. She is considering resuming her previous dose of 100 mg.     She has an upcoming appointment in October 2025 for fasting labs and to discuss her cholesterol levels. She is scheduled for a cancer scan on 08/29/2025. She has a history of brain surgery and struggles with writing and playing the piano.    Hobbies: Reading, playing piano, violin, and guitar    PAST SURGICAL HISTORY:  Brain surgery      Vitals:    07/21/25 1306   BP: 104/74   Pulse: 72   Resp: 16   SpO2: 98%     Body mass index is 24.29 kg/m².    Past Medical History:   Diagnosis Date    Anxiety     Cancer     Cerebral arterial aneurysm     Depression     Epilepsy 2012    LAST SEIZURE 2017    Gestational diabetes     H/O breast biopsy     Hyperglycemia     Hyperlipidemia     Migraines     PONV (postoperative nausea and vomiting)     Spinal headache 1986    FROM EPIDURAL HAD BLOOD PATCH X 2      Allergies   Allergen Reactions    Codeine Hives and Nausea And Vomiting     SWELLING OF THROAT    Rash along with nausea and vomiting   SWELLING OF THROAT   SWELLING OF THROAT   <paragraph>SWELLING OF  THROAT</paragraph>    Sulfa Antibiotics Hives     ITCHING    ITCHING   <paragraph>ITCHING</paragraph>      Social History     Socioeconomic History    Marital status:    Tobacco Use    Smoking status: Former     Current packs/day: 0.00     Average packs/day: 0.5 packs/day for 28.0 years (14.0 ttl pk-yrs)     Types: Cigarettes     Start date:      Quit date:      Years since quittin.5     Passive exposure: Past    Smokeless tobacco: Never   Vaping Use    Vaping status: Never Used   Substance and Sexual Activity    Alcohol use: No    Drug use: No    Sexual activity: Defer        Current Outpatient Medications:     cholecalciferol (VITAMIN D3) 25 MCG (1000 UT) tablet, Take 1 tablet by mouth Daily., Disp: , Rfl:     Coenzyme Q10 (COQ10) 200 MG capsule, Take 1 capsule by mouth 2 (Two) Times a Day., Disp: , Rfl:     ezetimibe (ZETIA) 10 MG tablet, Take 1 tablet by mouth once daily, Disp: 90 tablet, Rfl: 0    magnesium oxide (MAGOX) 400 (241.3 Mg) MG tablet tablet, Take 1 tablet by mouth Daily., Disp: , Rfl:     meloxicam (MOBIC) 15 MG tablet, Take 1 tablet by mouth Daily., Disp: 30 tablet, Rfl: 1    nitrofurantoin, macrocrystal-monohydrate, (Macrobid) 100 MG capsule, Take 1 capsule by mouth 2 (Two) Times a Day., Disp: 14 capsule, Rfl: 0    REPLENS VAGINAL MOISTURIZER VA, Insert 1 each into the vagina., Disp: , Rfl:     rosuvastatin (Crestor) 40 MG tablet, Take 1 tablet by mouth Daily., Disp: 90 tablet, Rfl: 3    topiramate (TOPAMAX) 50 MG tablet, Take 2 tablets by mouth twice daily, Disp: 360 tablet, Rfl: 0    sertraline (Zoloft) 100 MG tablet, Take 1 tablet by mouth Daily., Disp: 90 tablet, Rfl: 3     Objective     Review of Systems   Constitutional:  Negative for fatigue.   Respiratory:  Negative for shortness of breath.    Cardiovascular:  Negative for chest pain and palpitations.   Gastrointestinal:  Negative for nausea.   Neurological:  Positive for headaches. Negative for dizziness.    Psychiatric/Behavioral:  Negative for confusion.        Physical Exam  Vitals and nursing note reviewed.   Constitutional:       Comments: Pleasant, neatly groomed, no distress.   Neck:      Vascular: No carotid bruit.   Cardiovascular:      Rate and Rhythm: Regular rhythm.      Heart sounds: Normal heart sounds. No murmur heard.     No gallop.   Pulmonary:      Effort: No respiratory distress.      Breath sounds: No wheezing or rales.   Neurological:      Mental Status: She is oriented to person, place, and time.   Psychiatric:         Mood and Affect: Mood normal.         Behavior: Behavior normal.         Thought Content: Thought content normal.       Physical Exam  General: No acute distress    Results      Assessment & Plan    Problems Addressed this Visit          Mental Health    Depression with anxiety - Primary    Relevant Medications    sertraline (Zoloft) 100 MG tablet     Diagnoses         Codes Comments      Depression with anxiety    -  Primary ICD-10-CM: F41.8  ICD-9-CM: 300.4           Assessment & Plan  1. Depression.  - Severe throat burning and chest tightness occurred after increasing sertraline to 150 mg, leading to discontinuation.  - Symptoms improved on the lower dose but worsened after stopping the medication.  - Discussed the lack of difference between capsule and pill forms of the same medication.  - Prescription for sertraline 100 mg tablets has been sent to the pharmacy. Advised to monitor for recurrence of chest tightness or other adverse effects. Alternative treatments will be considered if symptoms persist or worsen.    Follow-up: The patient will follow up in October 2025.      No follow-ups on file.    Patient or patient representative verbalized consent for the use of Ambient Listening during the visit with  Aries Morejon MD for chart documentation. 7/25/2025  14:16 EDT

## (undated) DEVICE — RADIFOCUS GLIDEWIRE: Brand: GLIDEWIRE

## (undated) DEVICE — RADIFOCUS TORQUE DEVICE MULTI-TORQUE VISE: Brand: RADIFOCUS TORQUE DEVICE

## (undated) DEVICE — APPL CHLORAPREP W/TINT 26ML ORNG

## (undated) DEVICE — SOL NACL 0.9PCT 1000ML

## (undated) DEVICE — GOWN,PREVENTION PLUS,XXLARGE,STERILE: Brand: MEDLINE

## (undated) DEVICE — CVR PROB 96IN LF STRL

## (undated) DEVICE — KT NEURO CUST

## (undated) DEVICE — ENCORE® LATEX ORTHO SIZE 8.5, STERILE LATEX POWDER-FREE SURGICAL GLOVE: Brand: ENCORE

## (undated) DEVICE — CATH ANGIO TRCN NB BCN .038 5F 100CM DAV

## (undated) DEVICE — GLV SURG SENSICARE PI LF PF 7.5 GRN STRL

## (undated) DEVICE — PK ANGIO CERBRL RAD 40

## (undated) DEVICE — GLV SURG SENSICARE MICRO PF LF 8 STRL

## (undated) DEVICE — COPILOT BLEEDBACK CONTROL VALVE: Brand: COPILOT

## (undated) DEVICE — DRSNG SURESITE WNDW 4X4.5

## (undated) DEVICE — PINNACLE INTRODUCER SHEATH: Brand: PINNACLE

## (undated) DEVICE — Device

## (undated) DEVICE — SPNG GZ WOVN 4X4IN 12PLY 10/BX STRL

## (undated) DEVICE — SHLD ANGIO 2LAYR CIR FEN

## (undated) DEVICE — SOL NS 500ML

## (undated) DEVICE — GLV SURG BIOGEL LTX PF 7 1/2

## (undated) DEVICE — Device: Brand: D-STAT® DRY SILVER CLEAR TOPICAL HEMOSTAT

## (undated) DEVICE — 0.2 MICRON INTRAVENOUS FILTER FOR 96 HOUR USE WITH MICRO-BORE EXTENSION TUBING AN LUER-LOCK ADAPTER: Brand: PALL POSIDYNE® ELD FILTER

## (undated) DEVICE — EXTENSION SET, MALE LUER LOCK ADAPTER WITH RETRACTABLE COLLAR

## (undated) DEVICE — ST IV PRI VENOSET NV W/CLAMP 72IN

## (undated) DEVICE — MYNXGRIP 5F: Brand: MYNXGRIP